# Patient Record
Sex: FEMALE | Race: WHITE | Employment: OTHER | URBAN - METROPOLITAN AREA
[De-identification: names, ages, dates, MRNs, and addresses within clinical notes are randomized per-mention and may not be internally consistent; named-entity substitution may affect disease eponyms.]

---

## 2023-03-07 ENCOUNTER — HOSPITAL ENCOUNTER (INPATIENT)
Age: 88
LOS: 6 days | Discharge: NURSING, REHAB OR LONG-TERM CARE | DRG: 513 | End: 2023-03-13
Attending: EMERGENCY MEDICINE | Admitting: INTERNAL MEDICINE
Payer: MEDICARE

## 2023-03-07 ENCOUNTER — APPOINTMENT (OUTPATIENT)
Dept: GENERAL RADIOLOGY | Age: 88
DRG: 513 | End: 2023-03-07
Payer: MEDICARE

## 2023-03-07 ENCOUNTER — APPOINTMENT (OUTPATIENT)
Dept: CT IMAGING | Age: 88
DRG: 513 | End: 2023-03-07
Payer: MEDICARE

## 2023-03-07 ENCOUNTER — ANESTHESIA EVENT (OUTPATIENT)
Dept: OPERATING ROOM | Age: 88
End: 2023-03-07
Payer: MEDICARE

## 2023-03-07 ENCOUNTER — ANESTHESIA (OUTPATIENT)
Dept: OPERATING ROOM | Age: 88
End: 2023-03-07
Payer: MEDICARE

## 2023-03-07 DIAGNOSIS — S42.292A OTHER CLOSED DISPLACED FRACTURE OF PROXIMAL END OF LEFT HUMERUS, INITIAL ENCOUNTER: Primary | ICD-10-CM

## 2023-03-07 DIAGNOSIS — S62.511D OPEN DISPLACED FRACTURE OF PROXIMAL PHALANX OF RIGHT THUMB WITH ROUTINE HEALING, SUBSEQUENT ENCOUNTER: ICD-10-CM

## 2023-03-07 DIAGNOSIS — S62.511B OPEN DISPLACED FRACTURE OF PROXIMAL PHALANX OF RIGHT THUMB, INITIAL ENCOUNTER: ICD-10-CM
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
## 2023-03-07 PROBLEM — S42.202A CLOSED FRACTURE OF LEFT PROXIMAL HUMERUS: Status: ACTIVE | Noted: 2023-03-07

## 2023-03-07 PROBLEM — S42.212A FX HUMERAL NECK, LEFT, CLOSED, INITIAL ENCOUNTER: Status: ACTIVE | Noted: 2023-03-07

## 2023-03-07 LAB
ABO/RH: NORMAL
ANION GAP SERPL CALCULATED.3IONS-SCNC: 11 MMOL/L (ref 3–16)
ANTIBODY SCREEN: NORMAL
BASOPHILS ABSOLUTE: 0 K/UL (ref 0–0.2)
BASOPHILS RELATIVE PERCENT: 0.4 %
BUN BLDV-MCNC: 31 MG/DL (ref 7–20)
CALCIUM SERPL-MCNC: 9.9 MG/DL (ref 8.3–10.6)
CHLORIDE BLD-SCNC: 93 MMOL/L (ref 99–110)
CO2: 26 MMOL/L (ref 21–32)
CREAT SERPL-MCNC: 0.7 MG/DL (ref 0.6–1.2)
EKG ATRIAL RATE: 68 BPM
EKG DIAGNOSIS: NORMAL
EKG P AXIS: 56 DEGREES
EKG P-R INTERVAL: 236 MS
EKG Q-T INTERVAL: 424 MS
EKG QRS DURATION: 72 MS
EKG QTC CALCULATION (BAZETT): 450 MS
EKG R AXIS: -5 DEGREES
EKG T AXIS: 35 DEGREES
EKG VENTRICULAR RATE: 68 BPM
EOSINOPHILS ABSOLUTE: 0.1 K/UL (ref 0–0.6)
EOSINOPHILS RELATIVE PERCENT: 1.7 %
GFR SERPL CREATININE-BSD FRML MDRD: >60 ML/MIN/{1.73_M2}
GLUCOSE BLD-MCNC: 126 MG/DL (ref 70–99)
HCT VFR BLD CALC: 41.9 % (ref 36–48)
HEMOGLOBIN: 14 G/DL (ref 12–16)
INR BLD: 0.97 (ref 0.87–1.14)
LYMPHOCYTES ABSOLUTE: 0.9 K/UL (ref 1–5.1)
LYMPHOCYTES RELATIVE PERCENT: 12 %
MCH RBC QN AUTO: 33.1 PG (ref 26–34)
MCHC RBC AUTO-ENTMCNC: 33.5 G/DL (ref 31–36)
MCV RBC AUTO: 98.9 FL (ref 80–100)
MONOCYTES ABSOLUTE: 0.8 K/UL (ref 0–1.3)
MONOCYTES RELATIVE PERCENT: 10.3 %
NEUTROPHILS ABSOLUTE: 5.9 K/UL (ref 1.7–7.7)
NEUTROPHILS RELATIVE PERCENT: 75.6 %
PDW BLD-RTO: 13.1 % (ref 12.4–15.4)
PLATELET # BLD: 203 K/UL (ref 135–450)
PMV BLD AUTO: 9.2 FL (ref 5–10.5)
POTASSIUM REFLEX MAGNESIUM: 4.4 MMOL/L (ref 3.5–5.1)
PROTHROMBIN TIME: 12.8 SEC (ref 11.7–14.5)
RBC # BLD: 4.24 M/UL (ref 4–5.2)
SODIUM BLD-SCNC: 130 MMOL/L (ref 136–145)
WBC # BLD: 7.8 K/UL (ref 4–11)

## 2023-03-07 PROCEDURE — 3700000000 HC ANESTHESIA ATTENDED CARE: Performed by: ORTHOPAEDIC SURGERY

## 2023-03-07 PROCEDURE — 73030 X-RAY EXAM OF SHOULDER: CPT

## 2023-03-07 PROCEDURE — 2500000003 HC RX 250 WO HCPCS: Performed by: EMERGENCY MEDICINE

## 2023-03-07 PROCEDURE — 80048 BASIC METABOLIC PNL TOTAL CA: CPT

## 2023-03-07 PROCEDURE — 70450 CT HEAD/BRAIN W/O DYE: CPT

## 2023-03-07 PROCEDURE — 73140 X-RAY EXAM OF FINGER(S): CPT

## 2023-03-07 PROCEDURE — 93005 ELECTROCARDIOGRAM TRACING: CPT | Performed by: EMERGENCY MEDICINE

## 2023-03-07 PROCEDURE — 6360000002 HC RX W HCPCS

## 2023-03-07 PROCEDURE — 85025 COMPLETE CBC W/AUTO DIFF WBC: CPT

## 2023-03-07 PROCEDURE — 3600000014 HC SURGERY LEVEL 4 ADDTL 15MIN: Performed by: ORTHOPAEDIC SURGERY

## 2023-03-07 PROCEDURE — 2709999900 HC NON-CHARGEABLE SUPPLY: Performed by: ORTHOPAEDIC SURGERY

## 2023-03-07 PROCEDURE — 2580000003 HC RX 258: Performed by: ORTHOPAEDIC SURGERY

## 2023-03-07 PROCEDURE — 26746 TREAT FINGER FRACTURE EACH: CPT | Performed by: ORTHOPAEDIC SURGERY

## 2023-03-07 PROCEDURE — 86850 RBC ANTIBODY SCREEN: CPT

## 2023-03-07 PROCEDURE — 6360000002 HC RX W HCPCS: Performed by: ORTHOPAEDIC SURGERY

## 2023-03-07 PROCEDURE — 2580000003 HC RX 258

## 2023-03-07 PROCEDURE — 85610 PROTHROMBIN TIME: CPT

## 2023-03-07 PROCEDURE — 70486 CT MAXILLOFACIAL W/O DYE: CPT

## 2023-03-07 PROCEDURE — 7100000000 HC PACU RECOVERY - FIRST 15 MIN: Performed by: ORTHOPAEDIC SURGERY

## 2023-03-07 PROCEDURE — 2580000003 HC RX 258: Performed by: INTERNAL MEDICINE

## 2023-03-07 PROCEDURE — 6370000000 HC RX 637 (ALT 250 FOR IP): Performed by: EMERGENCY MEDICINE

## 2023-03-07 PROCEDURE — 2500000003 HC RX 250 WO HCPCS: Performed by: ORTHOPAEDIC SURGERY

## 2023-03-07 PROCEDURE — 6370000000 HC RX 637 (ALT 250 FOR IP): Performed by: ORTHOPAEDIC SURGERY

## 2023-03-07 PROCEDURE — 11012 DEB SKIN BONE AT FX SITE: CPT | Performed by: ORTHOPAEDIC SURGERY

## 2023-03-07 PROCEDURE — 7100000001 HC PACU RECOVERY - ADDTL 15 MIN: Performed by: ORTHOPAEDIC SURGERY

## 2023-03-07 PROCEDURE — 0RQW0ZZ REPAIR RIGHT FINGER PHALANGEAL JOINT, OPEN APPROACH: ICD-10-PCS | Performed by: ORTHOPAEDIC SURGERY

## 2023-03-07 PROCEDURE — 86900 BLOOD TYPING SEROLOGIC ABO: CPT

## 2023-03-07 PROCEDURE — 3600000004 HC SURGERY LEVEL 4 BASE: Performed by: ORTHOPAEDIC SURGERY

## 2023-03-07 PROCEDURE — 1200000000 HC SEMI PRIVATE

## 2023-03-07 PROCEDURE — 99285 EMERGENCY DEPT VISIT HI MDM: CPT

## 2023-03-07 PROCEDURE — 0RSW0ZZ REPOSITION RIGHT FINGER PHALANGEAL JOINT, OPEN APPROACH: ICD-10-PCS | Performed by: ORTHOPAEDIC SURGERY

## 2023-03-07 PROCEDURE — 99222 1ST HOSP IP/OBS MODERATE 55: CPT | Performed by: ORTHOPAEDIC SURGERY

## 2023-03-07 PROCEDURE — 6360000002 HC RX W HCPCS: Performed by: FAMILY MEDICINE

## 2023-03-07 PROCEDURE — 86901 BLOOD TYPING SEROLOGIC RH(D): CPT

## 2023-03-07 PROCEDURE — 3700000001 HC ADD 15 MINUTES (ANESTHESIA): Performed by: ORTHOPAEDIC SURGERY

## 2023-03-07 PROCEDURE — 71045 X-RAY EXAM CHEST 1 VIEW: CPT

## 2023-03-07 RX ORDER — BUDESONIDE 0.5 MG/2ML
0.5 INHALANT ORAL
Status: DISCONTINUED | OUTPATIENT
Start: 2023-03-07 | End: 2023-03-13 | Stop reason: HOSPADM

## 2023-03-07 RX ORDER — SODIUM CHLORIDE 0.9 % (FLUSH) 0.9 %
5-40 SYRINGE (ML) INJECTION PRN
Status: DISCONTINUED | OUTPATIENT
Start: 2023-03-07 | End: 2023-03-13 | Stop reason: HOSPADM

## 2023-03-07 RX ORDER — MORPHINE SULFATE 2 MG/ML
2 INJECTION, SOLUTION INTRAMUSCULAR; INTRAVENOUS
Status: DISPENSED | OUTPATIENT
Start: 2023-03-07 | End: 2023-03-09

## 2023-03-07 RX ORDER — ACETAMINOPHEN 650 MG/1
650 SUPPOSITORY RECTAL EVERY 6 HOURS PRN
Status: DISCONTINUED | OUTPATIENT
Start: 2023-03-07 | End: 2023-03-13 | Stop reason: HOSPADM

## 2023-03-07 RX ORDER — BUPIVACAINE HYDROCHLORIDE 5 MG/ML
INJECTION, SOLUTION EPIDURAL; INTRACAUDAL PRN
Status: DISCONTINUED | OUTPATIENT
Start: 2023-03-07 | End: 2023-03-07 | Stop reason: HOSPADM

## 2023-03-07 RX ORDER — OXYCODONE HYDROCHLORIDE 5 MG/1
5 TABLET ORAL ONCE
Status: COMPLETED | OUTPATIENT
Start: 2023-03-07 | End: 2023-03-07

## 2023-03-07 RX ORDER — VALSARTAN 320 MG/1
1 TABLET ORAL DAILY
COMMUNITY
Start: 2023-02-15

## 2023-03-07 RX ORDER — NIFEDIPINE 60 MG/1
1 TABLET, FILM COATED, EXTENDED RELEASE ORAL 2 TIMES DAILY
COMMUNITY
Start: 2023-01-19

## 2023-03-07 RX ORDER — HYDROCODONE BITARTRATE AND ACETAMINOPHEN 5; 325 MG/1; MG/1
1 TABLET ORAL EVERY 4 HOURS PRN
Status: DISCONTINUED | OUTPATIENT
Start: 2023-03-07 | End: 2023-03-13 | Stop reason: ALTCHOICE

## 2023-03-07 RX ORDER — MORPHINE SULFATE 2 MG/ML
2 INJECTION, SOLUTION INTRAMUSCULAR; INTRAVENOUS
Status: DISCONTINUED | OUTPATIENT
Start: 2023-03-07 | End: 2023-03-07

## 2023-03-07 RX ORDER — CLONIDINE HYDROCHLORIDE 0.1 MG/1
1 TABLET ORAL DAILY PRN
COMMUNITY
Start: 2020-10-13

## 2023-03-07 RX ORDER — POTASSIUM CHLORIDE 20 MEQ/1
40 TABLET, EXTENDED RELEASE ORAL PRN
Status: DISCONTINUED | OUTPATIENT
Start: 2023-03-07 | End: 2023-03-13 | Stop reason: HOSPADM

## 2023-03-07 RX ORDER — LABETALOL HYDROCHLORIDE 5 MG/ML
10 INJECTION, SOLUTION INTRAVENOUS
Status: DISCONTINUED | OUTPATIENT
Start: 2023-03-07 | End: 2023-03-07 | Stop reason: HOSPADM

## 2023-03-07 RX ORDER — LORAZEPAM 2 MG/ML
0.5 INJECTION INTRAMUSCULAR
Status: DISCONTINUED | OUTPATIENT
Start: 2023-03-07 | End: 2023-03-07 | Stop reason: HOSPADM

## 2023-03-07 RX ORDER — ACETAMINOPHEN 325 MG/1
650 TABLET ORAL EVERY 6 HOURS PRN
Status: DISCONTINUED | OUTPATIENT
Start: 2023-03-07 | End: 2023-03-13 | Stop reason: HOSPADM

## 2023-03-07 RX ORDER — IPRATROPIUM BROMIDE AND ALBUTEROL SULFATE 2.5; .5 MG/3ML; MG/3ML
1 SOLUTION RESPIRATORY (INHALATION)
Status: DISCONTINUED | OUTPATIENT
Start: 2023-03-07 | End: 2023-03-07 | Stop reason: HOSPADM

## 2023-03-07 RX ORDER — ATORVASTATIN CALCIUM 40 MG/1
40 TABLET, FILM COATED ORAL NIGHTLY
Status: DISCONTINUED | OUTPATIENT
Start: 2023-03-07 | End: 2023-03-13 | Stop reason: HOSPADM

## 2023-03-07 RX ORDER — MAGNESIUM SULFATE IN WATER 40 MG/ML
2000 INJECTION, SOLUTION INTRAVENOUS PRN
Status: DISCONTINUED | OUTPATIENT
Start: 2023-03-07 | End: 2023-03-13 | Stop reason: HOSPADM

## 2023-03-07 RX ORDER — SODIUM CHLORIDE 450 MG/100ML
INJECTION, SOLUTION INTRAVENOUS CONTINUOUS
Status: DISCONTINUED | OUTPATIENT
Start: 2023-03-07 | End: 2023-03-08

## 2023-03-07 RX ORDER — POTASSIUM CHLORIDE 7.45 MG/ML
10 INJECTION INTRAVENOUS PRN
Status: DISCONTINUED | OUTPATIENT
Start: 2023-03-07 | End: 2023-03-13 | Stop reason: HOSPADM

## 2023-03-07 RX ORDER — PROCHLORPERAZINE EDISYLATE 5 MG/ML
5 INJECTION INTRAMUSCULAR; INTRAVENOUS
Status: DISCONTINUED | OUTPATIENT
Start: 2023-03-07 | End: 2023-03-07 | Stop reason: HOSPADM

## 2023-03-07 RX ORDER — CARVEDILOL 6.25 MG/1
3.12 TABLET ORAL 2 TIMES DAILY WITH MEALS
Status: DISCONTINUED | OUTPATIENT
Start: 2023-03-07 | End: 2023-03-13 | Stop reason: HOSPADM

## 2023-03-07 RX ORDER — CLONIDINE HYDROCHLORIDE 0.1 MG/1
0.1 TABLET ORAL 2 TIMES DAILY
Status: DISCONTINUED | OUTPATIENT
Start: 2023-03-07 | End: 2023-03-07

## 2023-03-07 RX ORDER — ARFORMOTEROL TARTRATE 15 UG/2ML
15 SOLUTION RESPIRATORY (INHALATION)
Status: DISCONTINUED | OUTPATIENT
Start: 2023-03-07 | End: 2023-03-13 | Stop reason: HOSPADM

## 2023-03-07 RX ORDER — ONDANSETRON 2 MG/ML
4 INJECTION INTRAMUSCULAR; INTRAVENOUS
Status: DISCONTINUED | OUTPATIENT
Start: 2023-03-07 | End: 2023-03-07 | Stop reason: HOSPADM

## 2023-03-07 RX ORDER — SPIRONOLACTONE 25 MG/1
25 TABLET ORAL DAILY
Status: DISCONTINUED | OUTPATIENT
Start: 2023-03-07 | End: 2023-03-13 | Stop reason: HOSPADM

## 2023-03-07 RX ORDER — SODIUM CHLORIDE 9 MG/ML
25 INJECTION, SOLUTION INTRAVENOUS PRN
Status: DISCONTINUED | OUTPATIENT
Start: 2023-03-07 | End: 2023-03-07 | Stop reason: HOSPADM

## 2023-03-07 RX ORDER — ACETAMINOPHEN 650 MG/1
650 SUPPOSITORY RECTAL
Status: DISCONTINUED | OUTPATIENT
Start: 2023-03-07 | End: 2023-03-07 | Stop reason: HOSPADM

## 2023-03-07 RX ORDER — CLINDAMYCIN HYDROCHLORIDE 300 MG/1
300 CAPSULE ORAL ONCE
Status: COMPLETED | OUTPATIENT
Start: 2023-03-07 | End: 2023-03-07

## 2023-03-07 RX ORDER — MORPHINE SULFATE 2 MG/ML
2 INJECTION, SOLUTION INTRAMUSCULAR; INTRAVENOUS EVERY 4 HOURS PRN
Status: DISCONTINUED | OUTPATIENT
Start: 2023-03-07 | End: 2023-03-07

## 2023-03-07 RX ORDER — FAMOTIDINE 20 MG/1
1 TABLET, FILM COATED ORAL NIGHTLY PRN
COMMUNITY

## 2023-03-07 RX ORDER — SODIUM CHLORIDE 0.9 % (FLUSH) 0.9 %
5-40 SYRINGE (ML) INJECTION EVERY 12 HOURS SCHEDULED
Status: DISCONTINUED | OUTPATIENT
Start: 2023-03-07 | End: 2023-03-13 | Stop reason: HOSPADM

## 2023-03-07 RX ORDER — SENNOSIDES 8.8 MG/5ML
5 LIQUID ORAL 2 TIMES DAILY PRN
Status: DISCONTINUED | OUTPATIENT
Start: 2023-03-07 | End: 2023-03-13 | Stop reason: HOSPADM

## 2023-03-07 RX ORDER — ONDANSETRON 2 MG/ML
4 INJECTION INTRAMUSCULAR; INTRAVENOUS EVERY 6 HOURS PRN
Status: DISCONTINUED | OUTPATIENT
Start: 2023-03-07 | End: 2023-03-13 | Stop reason: HOSPADM

## 2023-03-07 RX ORDER — SPIRONOLACTONE 25 MG/1
1 TABLET ORAL DAILY
COMMUNITY
Start: 2023-02-17

## 2023-03-07 RX ORDER — VALSARTAN 320 MG/1
320 TABLET ORAL DAILY
Status: DISCONTINUED | OUTPATIENT
Start: 2023-03-07 | End: 2023-03-13 | Stop reason: HOSPADM

## 2023-03-07 RX ORDER — CARVEDILOL 3.12 MG/1
1 TABLET ORAL 2 TIMES DAILY WITH MEALS
COMMUNITY
Start: 2022-04-21

## 2023-03-07 RX ORDER — SODIUM CHLORIDE, SODIUM LACTATE, POTASSIUM CHLORIDE, CALCIUM CHLORIDE 600; 310; 30; 20 MG/100ML; MG/100ML; MG/100ML; MG/100ML
INJECTION, SOLUTION INTRAVENOUS CONTINUOUS PRN
Status: DISCONTINUED | OUTPATIENT
Start: 2023-03-07 | End: 2023-03-07 | Stop reason: SDUPTHER

## 2023-03-07 RX ORDER — PAROXETINE HYDROCHLORIDE 20 MG/1
20 TABLET, FILM COATED ORAL DAILY
Status: DISCONTINUED | OUTPATIENT
Start: 2023-03-08 | End: 2023-03-13 | Stop reason: HOSPADM

## 2023-03-07 RX ORDER — ENOXAPARIN SODIUM 100 MG/ML
40 INJECTION SUBCUTANEOUS DAILY
Status: CANCELLED | OUTPATIENT
Start: 2023-03-07

## 2023-03-07 RX ORDER — SODIUM CHLORIDE 9 MG/ML
INJECTION, SOLUTION INTRAVENOUS PRN
Status: DISCONTINUED | OUTPATIENT
Start: 2023-03-07 | End: 2023-03-13 | Stop reason: HOSPADM

## 2023-03-07 RX ORDER — ACETAMINOPHEN 500 MG
1000 TABLET ORAL
Status: COMPLETED | OUTPATIENT
Start: 2023-03-07 | End: 2023-03-07

## 2023-03-07 RX ORDER — SODIUM CHLORIDE 0.9 % (FLUSH) 0.9 %
5-40 SYRINGE (ML) INJECTION PRN
Status: DISCONTINUED | OUTPATIENT
Start: 2023-03-07 | End: 2023-03-07 | Stop reason: HOSPADM

## 2023-03-07 RX ORDER — NIFEDIPINE 60 MG/1
60 TABLET, EXTENDED RELEASE ORAL 2 TIMES DAILY
Status: DISCONTINUED | OUTPATIENT
Start: 2023-03-07 | End: 2023-03-13 | Stop reason: HOSPADM

## 2023-03-07 RX ORDER — SODIUM CHLORIDE 9 MG/ML
INJECTION, SOLUTION INTRAVENOUS CONTINUOUS
Status: DISCONTINUED | OUTPATIENT
Start: 2023-03-07 | End: 2023-03-08

## 2023-03-07 RX ORDER — FENTANYL CITRATE 50 UG/ML
25 INJECTION, SOLUTION INTRAMUSCULAR; INTRAVENOUS EVERY 5 MIN PRN
Status: DISCONTINUED | OUTPATIENT
Start: 2023-03-07 | End: 2023-03-07 | Stop reason: HOSPADM

## 2023-03-07 RX ORDER — ONDANSETRON 4 MG/1
4 TABLET, ORALLY DISINTEGRATING ORAL EVERY 8 HOURS PRN
Status: DISCONTINUED | OUTPATIENT
Start: 2023-03-07 | End: 2023-03-13 | Stop reason: HOSPADM

## 2023-03-07 RX ORDER — SODIUM CHLORIDE 0.9 % (FLUSH) 0.9 %
5-40 SYRINGE (ML) INJECTION EVERY 12 HOURS SCHEDULED
Status: DISCONTINUED | OUTPATIENT
Start: 2023-03-07 | End: 2023-03-07 | Stop reason: HOSPADM

## 2023-03-07 RX ORDER — FAMOTIDINE 20 MG/1
20 TABLET, FILM COATED ORAL NIGHTLY PRN
Status: DISCONTINUED | OUTPATIENT
Start: 2023-03-07 | End: 2023-03-13 | Stop reason: HOSPADM

## 2023-03-07 RX ORDER — POLYETHYLENE GLYCOL 3350 17 G/17G
17 POWDER, FOR SOLUTION ORAL DAILY PRN
Status: DISCONTINUED | OUTPATIENT
Start: 2023-03-07 | End: 2023-03-13 | Stop reason: HOSPADM

## 2023-03-07 RX ORDER — PROPOFOL 10 MG/ML
INJECTION, EMULSION INTRAVENOUS PRN
Status: DISCONTINUED | OUTPATIENT
Start: 2023-03-07 | End: 2023-03-07 | Stop reason: SDUPTHER

## 2023-03-07 RX ORDER — ATORVASTATIN CALCIUM 40 MG/1
1 TABLET, FILM COATED ORAL NIGHTLY
COMMUNITY
Start: 2023-02-14

## 2023-03-07 RX ORDER — FLUTICASONE FUROATE, UMECLIDINIUM BROMIDE AND VILANTEROL TRIFENATATE 200; 62.5; 25 UG/1; UG/1; UG/1
1 POWDER RESPIRATORY (INHALATION) 2 TIMES DAILY
COMMUNITY
Start: 2023-03-04

## 2023-03-07 RX ORDER — LIDOCAINE HYDROCHLORIDE 20 MG/ML
INJECTION, SOLUTION INTRAVENOUS PRN
Status: DISCONTINUED | OUTPATIENT
Start: 2023-03-07 | End: 2023-03-07 | Stop reason: SDUPTHER

## 2023-03-07 RX ORDER — ASPIRIN 81 MG/1
1 TABLET ORAL
Status: ON HOLD | COMMUNITY
End: 2023-03-10 | Stop reason: SDUPTHER

## 2023-03-07 RX ADMIN — HYDROMORPHONE HYDROCHLORIDE 0.5 MG: 1 INJECTION, SOLUTION INTRAMUSCULAR; INTRAVENOUS; SUBCUTANEOUS at 19:36

## 2023-03-07 RX ADMIN — LIDOCAINE HYDROCHLORIDE 5 ML: 10 INJECTION, SOLUTION EPIDURAL; INFILTRATION; INTRACAUDAL; PERINEURAL at 14:48

## 2023-03-07 RX ADMIN — ACETAMINOPHEN 1000 MG: 500 TABLET ORAL at 14:43

## 2023-03-07 RX ADMIN — SODIUM CHLORIDE: 9 INJECTION, SOLUTION INTRAVENOUS at 21:51

## 2023-03-07 RX ADMIN — HYDROMORPHONE HYDROCHLORIDE 0.5 MG: 1 INJECTION, SOLUTION INTRAMUSCULAR; INTRAVENOUS; SUBCUTANEOUS at 19:40

## 2023-03-07 RX ADMIN — HYDROCODONE BITARTRATE AND ACETAMINOPHEN 1 TABLET: 5; 325 TABLET ORAL at 22:16

## 2023-03-07 RX ADMIN — NIFEDIPINE 60 MG: 60 TABLET, FILM COATED, EXTENDED RELEASE ORAL at 22:08

## 2023-03-07 RX ADMIN — ATORVASTATIN CALCIUM 40 MG: 40 TABLET, FILM COATED ORAL at 21:55

## 2023-03-07 RX ADMIN — SODIUM CHLORIDE: 9 INJECTION, SOLUTION INTRAVENOUS at 16:49

## 2023-03-07 RX ADMIN — OXYCODONE 5 MG: 5 TABLET ORAL at 14:44

## 2023-03-07 RX ADMIN — CLINDAMYCIN HYDROCHLORIDE 300 MG: 300 CAPSULE ORAL at 14:42

## 2023-03-07 RX ADMIN — VALSARTAN 320 MG: 320 TABLET, FILM COATED ORAL at 22:04

## 2023-03-07 RX ADMIN — CARVEDILOL 3.12 MG: 6.25 TABLET, FILM COATED ORAL at 21:55

## 2023-03-07 RX ADMIN — LIDOCAINE HYDROCHLORIDE 50 MG: 20 INJECTION, SOLUTION INTRAVENOUS at 18:28

## 2023-03-07 RX ADMIN — SODIUM CHLORIDE, PRESERVATIVE FREE 10 ML: 5 INJECTION INTRAVENOUS at 22:06

## 2023-03-07 RX ADMIN — CEFAZOLIN 2000 MG: 2 INJECTION, POWDER, FOR SOLUTION INTRAMUSCULAR; INTRAVENOUS at 21:55

## 2023-03-07 RX ADMIN — SODIUM CHLORIDE, SODIUM LACTATE, POTASSIUM CHLORIDE, AND CALCIUM CHLORIDE: .6; .31; .03; .02 INJECTION, SOLUTION INTRAVENOUS at 18:20

## 2023-03-07 RX ADMIN — PROPOFOL 20 MG: 10 INJECTION, EMULSION INTRAVENOUS at 18:28

## 2023-03-07 ASSESSMENT — PAIN DESCRIPTION - LOCATION
LOCATION: SHOULDER

## 2023-03-07 ASSESSMENT — PAIN DESCRIPTION - DESCRIPTORS
DESCRIPTORS: DISCOMFORT
DESCRIPTORS: TENDER;TEARING
DESCRIPTORS: DISCOMFORT
DESCRIPTORS: ACHING
DESCRIPTORS: ACHING
DESCRIPTORS: SHARP
DESCRIPTORS: ACHING

## 2023-03-07 ASSESSMENT — PAIN SCALES - GENERAL
PAINLEVEL_OUTOF10: 9
PAINLEVEL_OUTOF10: 0
PAINLEVEL_OUTOF10: 4
PAINLEVEL_OUTOF10: 8
PAINLEVEL_OUTOF10: 4
PAINLEVEL_OUTOF10: 2
PAINLEVEL_OUTOF10: 6
PAINLEVEL_OUTOF10: 5
PAINLEVEL_OUTOF10: 7
PAINLEVEL_OUTOF10: 6

## 2023-03-07 ASSESSMENT — PAIN DESCRIPTION - ORIENTATION
ORIENTATION: LEFT

## 2023-03-07 ASSESSMENT — PAIN - FUNCTIONAL ASSESSMENT
PAIN_FUNCTIONAL_ASSESSMENT: 0-10
PAIN_FUNCTIONAL_ASSESSMENT: 0-10

## 2023-03-07 NOTE — H&P
Hospital Medicine History & Physical      PCP: Alyse Mcmullen, APRN - CNP    Date of Admission: 3/7/2023    Date of Service: Pt seen/examined on 3/7/23 and Admitted to Inpatient with expected LOS greater than two midnights due to medical therapy. Chief Complaint:    Fall (Pt tripped and landed on her left shoulder. Laceration on right thumb and upper lip.)         History Of Present Illness:      80 y.o. female Hx CHF,HTN, Anxiety,Emphysema who presented with after fall. She slipped on her way to [employer]. She was walking on ramp with construction. She landed on left side. Pt had pain after fall. She had no LOC. She sustained head trauma to left side of face. Pt is otherwise coherent. In ER,vitals unremarkable. Xray right finger showed acute fracture of the base of the first digit distal phalanx. Xray left shoulder showed acute minimally displaced fracture of the left humeral neck. CXR showed Small bilateral pleural effusions and acute left humeral neck fracture. CT head/maxillofacial showed Small midline/left anterior frontal scalp hematoma,Mild-to-moderate left infraorbital/upper cheek swelling. ER provider contacted Orthopedic Surgery. Plan for OR today. Past Medical History:          Diagnosis Date    Arthritis     Fractures     Osteoarthritis     Osteoporosis        Past Surgical History:          Procedure Laterality Date    HIP SURGERY         Medications Prior to Admission:      Prior to Admission medications    Medication Sig Start Date End Date Taking?  Authorizing Provider   atorvastatin (LIPITOR) 40 MG tablet Take 1 tablet by mouth at bedtime 2/14/23  Yes Historical Provider, MD   carvedilol (COREG) 3.125 MG tablet Take 1 tablet by mouth 2 times daily (with meals) 4/21/22  Yes Historical Provider, MD   cloNIDine (CATAPRES) 0.1 MG tablet Take 1 tablet by mouth 2 times daily 10/13/20  Yes Historical Provider, MD   aspirin 81 MG EC tablet Take 1 tablet by mouth Twice a Week    Historical Provider, MD famotidine (PEPCID) 20 MG tablet Take 1 tablet by mouth nightly as needed    Historical Provider, MD Cesario Habermann 200-62.5-25 MCG/ACT AEPB inhaler Inhale 1 spray into the lungs 2 times daily 3/4/23   Historical Provider, MD   NIFEdipine (ADALAT CC) 60 MG extended release tablet Take 1 tablet by mouth daily 1/19/23   Historical Provider, MD   spironolactone (ALDACTONE) 25 MG tablet Take 1 tablet by mouth daily 2/17/23   Historical Provider, MD   valsartan (DIOVAN) 320 MG tablet Take 1 tablet by mouth daily 2/15/23   Historical Provider, MD   PARoxetine (PAXIL) 20 MG tablet  3/25/16   Historical Provider, MD   hydrochlorothiazide (HYDRODIURIL) 25 MG tablet  2/6/16   Historical Provider, MD   fluorometholone (FML) 0.1 % ophthalmic suspension  3/31/16   Historical Provider, MD   acetaminophen-codeine (TYLENOL/CODEINE #3) 300-30 MG per tablet Take 1 tablet by mouth every 4 hours as needed for Pain 4/20/16   Dilcia Castaneda MD       Allergies:  Penicillins and Sulfa antibiotics    Social History:      The patient currently lives at home    TOBACCO:   reports that she has quit smoking. She has never used smokeless tobacco.  ETOH:   reports current alcohol use of about 1.0 standard drink per week. E-cigarette/Vaping       Questions Responses    E-cigarette/Vaping Use     Start Date     Passive Exposure     Quit Date     Counseling Given     Comments               Family History:       Reviewed in detail and negative for DM, CAD, Cancer, CVA. Positive as follows:        Problem Relation Age of Onset    Cancer Mother     Scoliosis Paternal Grandmother     Diabetes Paternal Grandfather        REVIEW OF SYSTEMS:   Pertinent positives as noted in the HPI. All other systems reviewed and negative.     PHYSICAL EXAM PERFORMED:    /66   Pulse 69   Resp 18   Ht 5' 5\" (1.651 m)   Wt 176 lb 1.6 oz (79.9 kg)   SpO2 98%   BMI 29.30 kg/m²     General appearance:  No apparent distress, appears stated age and cooperative. HEENT:  MMM,left orbit echymosis,left lip swelling  Neck: Supple, with full range of motion. No jugular venous distention. Trachea midline. Respiratory:  Normal respiratory effort. Clear to auscultation, bilaterally   Cardiovascular:  Regular rate and rhythm with normal S1/S2 without murmurs  Abdomen: Soft, non-tender, non-distended with normal bowel sounds. Musculoskeletal:  No edema bilaterally,left arm in sling, right thumb in dressing  Skin: Skin color, texture, turgor normal.  No rashes or lesions. Neurologic:  grossly non-focal.  Psychiatric:  Alert and oriented, thought content appropriate, normal insight      Labs:     No results for input(s): WBC, HGB, HCT, PLT in the last 72 hours. No results for input(s): NA, K, CL, CO2, BUN, CREATININE, CALCIUM, PHOS in the last 72 hours. Invalid input(s): MAGNES  No results for input(s): AST, ALT, BILIDIR, BILITOT, ALKPHOS in the last 72 hours. No results for input(s): INR in the last 72 hours. No results for input(s): Lattie Fanny in the last 72 hours. Urinalysis:    No results found for: Ayesha Beans, BACTERIA, RBCUA, BLOODU, Viann Richard, [address] [place] [id number]    Radiology:     EKG:  Pending    XR SHOULDER LEFT (MIN 2 VIEWS)   Final Result   1. Acute minimally displaced fracture of the left humeral neck. XR FINGER RIGHT (MIN 2 VIEWS)   Final Result   1. Acute fracture of the base of the first digit distal phalanx. XR CHEST PORTABLE   Final Result   1. Small bilateral pleural effusions. 2.  Acute fracture of the left humeral neck      CT MAXILLOFACIAL WO CONTRAST   Final Result   HEAD:   Small midline/left anterior frontal scalp hematoma with no underlying calvarial fracture, intrarenal hemorrhage, or mass effect. Mild atrophy and chronic small vessel ischemic change. FACE:   Mild-to-moderate left infraorbital/upper cheek swelling, with no underlying acute facial fracture.           CT Head W/O Contrast   Final Result   HEAD: Small midline/left anterior frontal scalp hematoma with no underlying calvarial fracture, intrarenal hemorrhage, or mass effect. Mild atrophy and chronic small vessel ischemic change. FACE:   Mild-to-moderate left infraorbital/upper cheek swelling, with no underlying acute facial fracture. ASSESSMENT:    Active Hospital Problems    Diagnosis Date Noted    Fx humeral neck, left, closed, initial encounter Ronal Doe 03/07/2023     Priority: Medium     Left Humeral neck fracture  -Orthopedic Surgery consulted. Plan for surgery today.  -Last stress test 5/2022 didn't show ischemia. Last Echo 1/2022 showed EF 55-60%. Pt is active and doesn't have CHF symptoms. EKG doesn't show ischemic changes. Pt has 3.9 % 30-day risk of death, MI, or cardiac arrest per cardiac risk index score. Ok to proceed with surgery.  -Pain control    Displaced first digit distal phalanx fracture. -Orthopedic Surgery consulted. Plan for surgery today.  -Pain control     Left infraorbital/upper cheek swelling  -Supportive care    Small midline/left anterior frontal scalp hematoma  -Supportive care. Avoiding heparin products    Mechanical fall  -PT/OT  -Fall precautions    Hypertension  -BP controlled    Chronic diastolic CHF  Hx Takotsubo cardimyopathy with LV thrombus in 2017  -Pt is compensated. Pt is on ARB and coreg.  -Telemetry. Hx PVCs  -Per outpatient Cardiology note, pt wore Holter and event recorder no Afib seen     Emphysema  -Pt is on treligy and follows with Pulmonology outpatient.       DVT Prophylaxis: lSCDs  Diet: Diet NPO  Code Status: No Order    Code Status: No Order    Surrogate decision maker confirmed with patient:   Extended Emergency Contact Information  Primary Emergency Contact: Samuel Pan  Address: [address] [facility], [address] [place] [address] Phone: [phone]  Mobile Phone: [phone]  Relation: Spouse    PT/OT Eval Status: ordered    [address] [place] [address] in 1-2days Martha Zabala MD    Thank you ALESSANDRA Linda CNP for the opportunity to be involved in this patient's care. If you have any questions or concerns please feel free to contact me at [phone].

## 2023-03-07 NOTE — CONSULTS
[facility] Orthopedic Surgery  Consult Note         This patient is seen in consultation at the request of Dr Christopher Prasad MD    Reason for Consult:    1- Right hand pain/ thumb distal phalanx base open fracture. 2- Left shoulder pain/ moderately displaced proximal humerus fracture. CHIEF COMPLAINT:    1- Right hand pain/ thumb distal phalanx base open fracture. 2- Left shoulder pain/ moderately displaced proximal humerus fracture. History Obtained From:  patient, electronic medical record    HISTORY OF PRESENT ILLNESS:    Ms. Romie Cabrera 80 y.o.  female right handed seen today at [facility] for consultation and evaluation of a right hand thumb and left shoulder injury which occurred when she tripped and fell. She is complaining of right thumb and left shoulder pain and swelling with open wound right thumb. This is better with elevation and worse with ROM. The pain is sharp and not radiating. No other complaint. She was seen at [facility], was evaluated and I was consulted. Past Medical History:        Diagnosis Date    Arthritis     Fractures     Osteoarthritis     Osteoporosis        Past Surgical History:        Procedure Laterality Date    HIP SURGERY         Medications prior to admission:   Prior to Admission medications    Medication Sig Start Date End Date Taking?  Authorizing Provider   atorvastatin (LIPITOR) 40 MG tablet Take 1 tablet by mouth at bedtime 2/14/23  Yes Historical Provider, MD   carvedilol (COREG) 3.125 MG tablet Take 1 tablet by mouth 2 times daily (with meals) 4/21/22  Yes Historical Provider, MD   cloNIDine (CATAPRES) 0.1 MG tablet Take 1 tablet by mouth 2 times daily 10/13/20  Yes Historical Provider, MD   aspirin 81 MG EC tablet Take 1 tablet by mouth Twice a Week    Historical Provider, MD   famotidine (PEPCID) 20 MG tablet Take 1 tablet by mouth nightly as needed    Historical Provider, MD Aleida Godinez ELLIPTA 200-62.5-25 MCG/ACT AEPB inhaler Inhale 1 spray into the lungs 2 times daily 3/4/23   Historical Provider, MD   NIFEdipine (ADALAT CC) 60 MG extended release tablet Take 1 tablet by mouth daily 1/19/23   Historical Provider, MD   spironolactone (ALDACTONE) 25 MG tablet Take 1 tablet by mouth daily 2/17/23   Historical Provider, MD   valsartan (DIOVAN) 320 MG tablet Take 1 tablet by mouth daily 2/15/23   Historical Provider, MD   PARoxetine (PAXIL) 20 MG tablet  3/25/16   Historical Provider, MD   hydrochlorothiazide (HYDRODIURIL) 25 MG tablet  2/6/16   Historical Provider, MD   fluorometholone (FML) 0.1 % ophthalmic suspension  3/31/16   Historical Provider, MD   acetaminophen-codeine (TYLENOL/CODEINE #3) 300-30 MG per tablet Take 1 tablet by mouth every 4 hours as needed for Pain 4/20/16   Kami Powers MD       Current Medications:   Current Facility-Administered Medications: atorvastatin (LIPITOR) tablet 40 mg, 40 mg, Oral, Nightly  carvedilol (COREG) tablet 3.125 mg, 3.125 mg, Oral, BID WC  cloNIDine (CATAPRES) tablet 0.1 mg, 0.1 mg, Oral, BID  famotidine (PEPCID) tablet 20 mg, 20 mg, Oral, Nightly PRN  NIFEdipine (ADALAT CC) extended release tablet 60 mg, 60 mg, Oral, Daily  spironolactone (ALDACTONE) tablet 25 mg, 25 mg, Oral, Daily  fluticasone-umeclidin-vilant (TRELEGY ELLIPTA) 200-62.5-25 MCG/ACT inhaler 1 puff, 1 puff, Inhalation, BID  valsartan (DIOVAN) tablet 320 mg, 1 tablet, Oral, Daily  sodium chloride flush 0.9 % injection 5-40 mL, 5-40 mL, IntraVENous, 2 times per day  sodium chloride flush 0.9 % injection 5-40 mL, 5-40 mL, IntraVENous, PRN  0.9 % sodium chloride infusion, , IntraVENous, PRN  ondansetron (ZOFRAN-ODT) disintegrating tablet 4 mg, 4 mg, Oral, Q8H PRN **OR** ondansetron (ZOFRAN) injection 4 mg, 4 mg, IntraVENous, Q6H PRN  polyethylene glycol (GLYCOLAX) packet 17 g, 17 g, Oral, Daily PRN  acetaminophen (TYLENOL) tablet 650 mg, 650 mg, Oral, Q6H PRN **OR** acetaminophen (TYLENOL) suppository 650 mg, 650 mg, Rectal, Q6H PRN  0.9 % sodium chloride infusion, , IntraVENous, Continuous  potassium chloride (KLOR-CON M) extended release tablet 40 mEq, 40 mEq, Oral, PRN **OR** potassium bicarb-citric acid (EFFER-K) effervescent tablet 40 mEq, 40 mEq, Oral, PRN **OR** potassium chloride 10 mEq/100 mL IVPB (Peripheral Line), 10 mEq, IntraVENous, PRN  magnesium sulfate 2000 mg in 50 mL IVPB premix, 2,000 mg, IntraVENous, PRN  senna (SENOKOT) 8.8 MG/5ML syrup 8.8 mg, 5 mL, Oral, BID PRN  morphine (PF) injection 2 mg, 2 mg, IntraVENous, Q4H PRN  HYDROcodone-acetaminophen (NORCO) 5-325 MG per tablet 1 tablet, 1 tablet, Oral, Q4H PRN  [START ON 3/8/2023] PARoxetine (PAXIL) tablet 20 mg, 20 mg, Oral, Daily    Allergies:  Penicillins and Sulfa antibiotics    Social History     Socioeconomic History    Marital status:      Spouse name: Not on file    Number of children: Not on file    Years of education: Not on file    Highest education level: Not on file   Occupational History    Not on file   Tobacco Use    Smoking status: Former    Smokeless tobacco: Never   Substance and Sexual Activity    Alcohol use:  Yes     Alcohol/week: 1.0 standard drink     Types: 1 Glasses of wine per week    Drug use: No    Sexual activity: Never   Other Topics Concern    Not on file   Social History Narrative    Not on file     Social Determinants of Health     Financial Resource Strain: Not on file   Food Insecurity: Not on file   Transportation Needs: Not on file   Physical Activity: Not on file   Stress: Not on file   Social Connections: Not on file   Intimate Partner Violence: Not on file   Housing Stability: Not on file       Family History:  Family History   Problem Relation Age of Onset    Cancer Mother     Scoliosis Paternal Grandmother     Diabetes Paternal Grandfather          REVIEW OF SYSTEMS:   CONSTITUTIONAL: Denies unexplained weight loss, fevers, chills or fatigue  NEUROLOGICAL: Denies unsteady gait or progressive weakness    PSYCHOLOGICAL: Denies anxiety, depression SKIN: Denies skin changes, delayed healing, rash, itching   HEMATOLOGIC: Denies easy bleeding or bruising  ENDOCRINE: Denies excessive thirst, urination, heat/cold  RESPIRATORY: Denies current dyspnea, cough  CARDIOVASCULAR: Negative for chest pain at this time. EYES: Negative for photophobia and visual disturbance. ENT:  Negative for rhinorrhea, epistaxis, sore throat, or hearing loss. GI: Denies nausea, vomiting, diarrhea   : Denies bowel or bladder issues   MUSCULOSKELETAL: Right thumb and left shoulder pain. All other ROS reviewed in chart or with patient or family and are grossly negative. PHYSICAL EXAMINATION:  Ms. Gamal Murphy is a very pleasant 80 y.o. female who seen today in no acute distress, awake, alert, and oriented. She is well nourished and groomed. Patient with normal affect. Body mass index is 29.3 kg/m². . Skin warm and dry. Resting respiratory rate is 16. Resp deep and easy. Pulse is with regular rate and rhythm    /82   Pulse 80   Temp 97.8 °F (36.6 °C) (Oral)   Resp 18   Ht 5' 5\" (1.651 m)   Wt 176 lb 1.6 oz (79.9 kg)   SpO2 100%   BMI 29.30 kg/m²        Airway is intact  Chest: chest clear, no wheezing, rales, normal symmetric air entry, no tachypnea, retractions or cyanosis  Heart: regular rate and rhythm ; heart sounds normal   Hearing intact, pupil equal and reactive bilateral  Lymphatics; No groin or axillary enlarged lymph nodes. Neck; No swelling  Abdomen; soft, non distended. MUSCULOSKELETAL:   Examination of both Upper extremities showing a decreased range of motion of the right thumb compare to the other side. There is moderate swelling that can be seen, as well as open wound of the thumb. She has intact sensation and good radial pulses. She has significant tenderness on deep palpation over the distal phalanx of the thumb right hand. There is no rotational deformity of the right thumb. There is moderate swelling and minimal ecchymosis left shoulder.   She is tender to palpation over the shoulder, and otherwise non tender over the remainder of the extremity. Range of motion is decreased secondary to pain over the left shoulder. The skin overlying the left shoulder is intact without evidence of lesion, laceration. Distal pulses are 2+ and symmetric bilaterally. Sensation is grossly intact to light touch and symmetric bilaterally. NEUROLOGIC:   Sensory:    Touch:                     Right Upper Extremity:  normal                   Left Upper Extremity:  normal                  Right Lower Extremity:  normal                  Left Lower Extremity:  normal                  DATA:    CBC:   Lab Results   Component Value Date/Time    WBC 7.8 03/07/2023 03:12 PM    RBC 4.24 03/07/2023 03:12 PM    HGB 14.0 03/07/2023 03:12 PM    HCT 41.9 03/07/2023 03:12 PM    MCV 98.9 03/07/2023 03:12 PM    MCH 33.1 03/07/2023 03:12 PM    MCHC 33.5 03/07/2023 03:12 PM    RDW 13.1 03/07/2023 03:12 PM     03/07/2023 03:12 PM    MPV 9.2 03/07/2023 03:12 PM     WBC:    Lab Results   Component Value Date/Time    WBC 7.8 03/07/2023 03:12 PM     PT/INR:    Lab Results   Component Value Date/Time    PROTIME 12.8 03/07/2023 03:12 PM    INR 0.97 03/07/2023 03:12 PM     PTT:  No results found for: APTT[APTT    IMAGING:  Xray's were reviewed, dated 3/7/2023,  3 views of the right hand, and showed a thumb distal phalanx base minimally displaced fracture. Xrays dated 3/7/2023, 3 views of left shoulder were reviewed, and showed moderately displaced proximal humerus fracture. IMPRESSION:  1- Right hand pain/ thumb distal phalanx base open fracture. 2- Left shoulder pain/ moderately displaced proximal humerus fracture. PLAN:  I discussed with Ruby Ferrari the overall alignment of the right thumb open fracture and treatment options including both surgical and non-surgical treatment, and that my recommendation is an urgent I&D open thumb fracture.      I discussed the risks and benefits of surgery with the patient, including but not limited to infection, bleeding, pain, injury to nerves or blood vessels failure of the surgery and need for additional surgery. All the patient's questions were answered. We discussed an expected post-operative course. She  is understanding of this and wishes to proceed. Regarding her left proximal humerus, we may consider ORIF the next week as outpatient if still indicated. Thank you very much for the kind consultation and allowing me to participate in this patient's care. I will continue to keep you apprised of her progress.         Tony Mares MD   3/7/2023  6:13 PM

## 2023-03-07 NOTE — ANESTHESIA PRE PROCEDURE
Department of Anesthesiology  Preprocedure Note       Name:  Evone Sandifer   Age:  80 y.o.  :  1935                                          MRN:  [id number]         Date:  3/7/2023      Surgeon: Haleigh Duenas):  Mariana Du MD    Procedure: Procedure(s):  DEBRIDEMENT INCISION AND DRAINAGE OF RIGHT THUMB    Medications prior to admission:   Prior to Admission medications    Medication Sig Start Date End Date Taking?  Authorizing Provider   atorvastatin (LIPITOR) 40 MG tablet Take 1 tablet by mouth at bedtime 23  Yes Historical Provider, MD   carvedilol (COREG) 3.125 MG tablet Take 1 tablet by mouth 2 times daily (with meals) 22  Yes Historical Provider, MD   cloNIDine (CATAPRES) 0.1 MG tablet Take 1 tablet by mouth 2 times daily 10/13/20  Yes Historical Provider, MD   aspirin 81 MG EC tablet Take 1 tablet by mouth Twice a Week    Historical Provider, MD   famotidine (PEPCID) 20 MG tablet Take 1 tablet by mouth nightly as needed    Historical Provider, MD Rosado Sabal 200-62.5-25 MCG/ACT AEPB inhaler Inhale 1 spray into the lungs 2 times daily 3/4/23   Historical Provider, MD   NIFEdipine (ADALAT CC) 60 MG extended release tablet Take 1 tablet by mouth daily 23   Historical Provider, MD   spironolactone (ALDACTONE) 25 MG tablet Take 1 tablet by mouth daily 23   Historical Provider, MD   valsartan (DIOVAN) 320 MG tablet Take 1 tablet by mouth daily 2/15/23   Historical Provider, MD   PARoxetine (PAXIL) 20 MG tablet  3/25/16   Historical Provider, MD   hydrochlorothiazide (HYDRODIURIL) 25 MG tablet  16   Historical Provider, MD   fluorometholone (FML) 0.1 % ophthalmic suspension  3/31/16   Historical Provider, MD   acetaminophen-codeine (TYLENOL/CODEINE #3) 300-30 MG per tablet Take 1 tablet by mouth every 4 hours as needed for Pain 16   Laquita Barrera MD       Current medications:    Current Facility-Administered Medications   Medication Dose Route Frequency Provider Last Rate Last Admin    atorvastatin (LIPITOR) tablet 40 mg  40 mg Oral Nightly Angelita Banegas MD        carvedilol (COREG) tablet 3.125 mg  3.125 mg Oral BID  Alejandro Vallejo MD        cloNIDine (CATAPRES) tablet 0.1 mg  0.1 mg Oral BID Alejandro Vallejo MD        famotidine (PEPCID) tablet 20 mg  20 mg Oral Nightly PRN Alejandro Vallejo MD        NIFEdipine (ADALAT CC) extended release tablet 60 mg  60 mg Oral Daily Alejandro Vallejo MD        spironolactone (ALDACTONE) tablet 25 mg  25 mg Oral Daily Alejandro Vallejo MD        fluticasone-umeclidin-vilant (TRELEGY ELLIPTA) 200-62.5-25 MCG/ACT inhaler 1 puff  1 puff Inhalation BID Alejandro Vallejo MD        valsartan (DIOVAN) tablet 320 mg  1 tablet Oral Daily Alejandro Vallejo MD        sodium chloride flush 0.9 % injection 5-40 mL  5-40 mL IntraVENous 2 times per day Alejandro Vallejo MD        sodium chloride flush 0.9 % injection 5-40 mL  5-40 mL IntraVENous PRN Alejandro Vallejo MD        0.9 % sodium chloride infusion   IntraVENous PRN Alejandro Vallejo MD        ondansetron (ZOFRAN-ODT) disintegrating tablet 4 mg  4 mg Oral Q8H PRN Alejandro Vallejo MD        Or    ondansetron (ZOFRAN) injection 4 mg  4 mg IntraVENous Q6H PRN Alejandro Vallejo MD        polyethylene glycol (GLYCOLAX) packet 17 g  17 g Oral Daily PRN Alejandro Vallejo MD        acetaminophen (TYLENOL) tablet 650 mg  650 mg Oral Q6H PRN Alejandro Vallejo MD        Or    acetaminophen (TYLENOL) suppository 650 mg  650 mg Rectal Q6H PRN Angelita Banegas MD        0.9 % sodium chloride infusion   IntraVENous Continuous Alejandro Vallejo MD 75 mL/hr at 03/07/23 1649 New Bag at 03/07/23 1649    potassium chloride (KLOR-CON M) extended release tablet 40 mEq  40 mEq Oral PRN Alejandro Vallejo MD        Or    potassium bicarb-citric acid (EFFER-K) effervescent tablet 40 mEq  40 mEq Oral PRN Alejandro Vallejo MD        Or    potassium chloride 10 mEq/100 mL IVPB (Peripheral Line)  10 mEq IntraVENous PRN Alejandro Vallejo MD        magnesium sulfate 2000 mg in 50 mL IVPB premix  2,000 mg IntraVENous PRN Alejandro Vallejo MD        senna (SENOKOT) 8.8 MG/5ML syrup 8.8 mg  5 mL Oral BID PRN Alejandro Vallejo MD        morphine (PF) injection 2 mg  2 mg IntraVENous Q4H PRN Alejandro Vallejo MD        HYDROcodone-acetaminophen (NORCO) 5-325 MG per tablet 1 tablet  1 tablet Oral Q4H PRN Alejandro Vallejo MD       Marcie Alu [START ON 3/8/2023] PARoxetine (PAXIL) tablet 20 mg  20 mg Oral Daily Alejandro Vallejo MD         Current Outpatient Medications   Medication Sig Dispense Refill    atorvastatin (LIPITOR) 40 MG tablet Take 1 tablet by mouth at bedtime      carvedilol (COREG) 3.125 MG tablet Take 1 tablet by mouth 2 times daily (with meals)      cloNIDine (CATAPRES) 0.1 MG tablet Take 1 tablet by mouth 2 times daily      aspirin 81 MG EC tablet Take 1 tablet by mouth Twice a Week      famotidine (PEPCID) 20 MG tablet Take 1 tablet by mouth nightly as needed      TRELEGY ELLIPTA 200-62.5-25 MCG/ACT AEPB inhaler Inhale 1 spray into the lungs 2 times daily      NIFEdipine (ADALAT CC) 60 MG extended release tablet Take 1 tablet by mouth daily      spironolactone (ALDACTONE) 25 MG tablet Take 1 tablet by mouth daily      valsartan (DIOVAN) 320 MG tablet Take 1 tablet by mouth daily      PARoxetine (PAXIL) 20 MG tablet       hydrochlorothiazide (HYDRODIURIL) 25 MG tablet       fluorometholone (FML) 0.1 % ophthalmic suspension       acetaminophen-codeine (TYLENOL/CODEINE #3) 300-30 MG per tablet Take 1 tablet by mouth every 4 hours as needed for Pain 30 tablet 0       Allergies:     Allergies   Allergen Reactions    Penicillins Hives    Sulfa Antibiotics Hives       Problem List:    Patient Active Problem List   Diagnosis Code    Tibialis tendinitis M76.829    Contusion, ankle S90.00XA    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    DDD (degenerative disc disease), thoracolumbar M51.35    Degenerative scoliosis in adult patient M41.50    Fx humeral neck, left, closed, initial encounter S42.212A       Past Medical History:        Diagnosis Date    Arthritis     Fractures     Osteoarthritis     Osteoporosis        Past Surgical History:        Procedure Laterality Date    HIP SURGERY         Social History:    Social History     Tobacco Use    Smoking status: Former    Smokeless tobacco: Never   Substance Use Topics    Alcohol use: Yes     Alcohol/week: 1.0 standard drink     Types: 1 Glasses of wine per week                                Counseling given: Not Answered      Vital Signs (Current):   Vitals:    03/07/23 1444 03/07/23 1639 03/07/23 1641 03/07/23 1730   BP:   127/73 137/82   Pulse:   78 80   Resp: 18  18 18   Temp:  97.8 °F (36.6 °C)     TempSrc:  Oral     SpO2:   93% 100%   Weight:       Height:                                                  BP Readings from Last 3 Encounters:   03/07/23 137/82   12/03/13 127/67   11/12/13 132/70       NPO Status:                                                                                 BMI:   Wt Readings from Last 3 Encounters:   03/07/23 176 lb 1.6 oz (79.9 kg)   04/20/16 172 lb 8 oz (78.2 kg)   12/03/13 156 lb (70.8 kg)     Body mass index is 29.3 kg/m².     CBC:   Lab Results   Component Value Date/Time    WBC 7.8 03/07/2023 03:12 PM    RBC 4.24 03/07/2023 03:12 PM    HGB 14.0 03/07/2023 03:12 PM    HCT 41.9 03/07/2023 03:12 PM    MCV 98.9 03/07/2023 03:12 PM    RDW 13.1 03/07/2023 03:12 PM     03/07/2023 03:12 PM       CMP:   Lab Results   Component Value Date/Time     03/07/2023 03:12 PM    K 4.4 03/07/2023 03:12 PM    CL 93 03/07/2023 03:12 PM    CO2 26 03/07/2023 03:12 PM    BUN 31 03/07/2023 03:12 PM    CREATININE 0.7 03/07/2023 03:12 PM    LABGLOM >60 03/07/2023 03:12 PM    GLUCOSE 126 03/07/2023 03:12 PM    CALCIUM 9.9 03/07/2023 03:12 PM       POC Tests: No results for input(s): POCGLU, POCNA, POCK, POCCL, POCBUN, POCHEMO, POCHCT in the last 72 hours.    Coags:   Lab Results   Component Value Date/Time    PROTIME 12.8 03/07/2023 03:12 PM    INR 0.97 03/07/2023 03:12 PM       HCG (If Applicable): No results found for: PREGTESTUR, PREGSERUM, HCG, HCGQUANT     ABGs: No results found for: PHART, PO2ART, EWU4UGZ, KZA9LMD, BEART, Z8OTHJGW     Type & Screen (If Applicable):  No results found for: LABABO, LABRH    Drug/Infectious Status (If Applicable):  No results found for: HIV, HEPCAB    COVID-19 Screening (If Applicable): No results found for: COVID19        Anesthesia Evaluation    Airway: Mallampati: II  TM distance: >3 FB   Neck ROM: full  Mouth opening: > = 3 FB   Dental:          Pulmonary:                              Cardiovascular:            Rhythm: regular  Rate: normal                    Neuro/Psych:               GI/Hepatic/Renal:             Endo/Other:                     Abdominal:             Vascular:          Other Findings:           Anesthesia Plan      general     ASA 2 - emergent       Induction: intravenous.      Anesthetic plan and risks discussed with patient.      Plan discussed with CRNA.                    ANIRUDH DOUGHERTY MD   3/7/2023

## 2023-03-07 NOTE — ED PROVIDER NOTES
[id number] [facility]          ATTENDING PHYSICIAN NOTE       Date of evaluation: 3/7/2023    Chief Complaint     Fall (Pt tripped and landed on her left shoulder. Laceration on right thumb and upper lip.)      History of Present Illness     Shabana Calle is a 80 y.o. female who presents to the emergency department today after a fall with right hand and left shoulder pain. Patient states she was walking into a building for [employer] when she tripped on an uneven piece of concrete and fell onto her left shoulder as well as an outstretched right hand. No preceding lightheadedness or dizziness. She is right-hand dominant. She noticed a large laceration to her right thumb and the bone sticking out. EMS was called. She did hit her face and head but did not lose consciousness. She is not on anticoagulation. She denies pain elsewhere such as in her chest, abdomen, back, or hips. No lower extremity pain. Typically ambulates with a cane in her right hand. Physical Exam     INITIAL VITALS: BP: 123/66, Temp: 97.8 °F (36.6 °C), Heart Rate: 69, Resp: 18, SpO2: 98 %   Physical Exam      Nursing note and vitals reviewed. General:  Adult female, alert and appropriately interactive. In no distress. HENT: Periorbital ecchymosis with tenderness in the left inferior periorbital region without crepitus or step-off. External ears normal. Nose appears normal externally. Abrasions and small superficial laceration of the upper lip without subcutaneous tissue exposed or transection of the vermilion border. Eyes: Conjunctivae normal. No scleral icterus. PERRL, EOMI, no nystagmus. Neck: Neck supple. No tracheal deviation present. No midline C-spine tenderness. CV: Normal rate. Regular rhythm. S1/S2 auscultated. No murmurs, gallops or rubs. 2+ bilateral radial pulses. Pulm: Effort normal. Breath sounds clear to auscultation bilaterally. No wheezes. No rales or rhonchi.   No chest wall tenderness or crepitus. GI: Soft. No distension. No tenderness. No rebound or guarding. No masses. No peritoneal signs. Musculoskeletal: Open fracture of the right thumb near the IP joint on the volar aspect, no exposed bone currently. Left humeral head tenderness without gross deformity. No instability of the pelvis or tenderness with logroll bilateral legs. No lower extremity tenderness or wounds. Neurological: Alert and appropriately interactive. Face symmetric, speech without dysarthria or obvious aphasia. Moving all extremities spontaneously. Sensation grossly intact in bilateral hands including distal to open thumb fracture on right. Skin: Open fracture laceration on right thumb as above. Warm, dry. No rash. No diaphoresis or erythema. Psychiatric: Calm and cooperative with appropriate mood and affect. ASSESSMENT / PLAN  (MEDICAL DECISION MAKING)   Laurent Pittman is a 80 y.o. female who presents to the emergency department today for a trip and fall. She has an open fracture to the right thumb near the IP joint as well as other areas of ecchymosis and tenderness which were all imaged. The fracture of her right thumb is noted as well as a humeral head fracture on the left. As such she unfortunately does not have much functional use of either upper extremity at this time. Head and facial CTs without acute emergent findings. I contacted the orthopedist on-call, Dr. Alexander Rodgers, who has graciously agreed to take the patient to the operating room for washout of the open fracture and probable pinning of the fracture. Patient was empirically given clindamycin for the open fracture given multiple other significant allergies including to penicillins, sulfas, and possibly cephalosporins. The left arm has been placed in a sling for comfort. Her tetanus immunization is up-to-date.   Patient discussed with the hospitalist for admission as she will require intensive PT/OT given the bilateral upper extremity injuries. Medical Decision Making  Amount and/or Complexity of Data Reviewed  Labs: ordered. Radiology: ordered. ECG/medicine tests: ordered. Discussion of management or test interpretation with external provider(s): Orthopedics    Risk  OTC drugs. Prescription drug management. Decision regarding hospitalization. Minor surgery with identified risk factors. Clinical Impression     1. Other closed displaced fracture of proximal end of left humerus, initial encounter    2. Open displaced fracture of proximal phalanx of right thumb, initial encounter    3. Fall from standing, initial encounter        Disposition     PATIENT REFERRED TO:  No follow-up provider specified. DISCHARGE MEDICATIONS:  New Prescriptions    No medications on file       DISPOSITION Admitted 03/07/2023 03:25:44 PM        Abi Diaz MD                 Diagnostic Results and Other Data         RADIOLOGY:  XR SHOULDER LEFT (MIN 2 VIEWS)   Final Result   1. Acute minimally displaced fracture of the left humeral neck. XR FINGER RIGHT (MIN 2 VIEWS)   Final Result   1. Acute fracture of the base of the first digit distal phalanx. XR CHEST PORTABLE   Final Result   1. Small bilateral pleural effusions. 2.  Acute fracture of the left humeral neck      CT MAXILLOFACIAL WO CONTRAST   Final Result   HEAD:   Small midline/left anterior frontal scalp hematoma with no underlying calvarial fracture, intrarenal hemorrhage, or mass effect. Mild atrophy and chronic small vessel ischemic change. FACE:   Mild-to-moderate left infraorbital/upper cheek swelling, with no underlying acute facial fracture. CT Head W/O Contrast   Final Result   HEAD:   Small midline/left anterior frontal scalp hematoma with no underlying calvarial fracture, intrarenal hemorrhage, or mass effect. Mild atrophy and chronic small vessel ischemic change.       FACE:   Mild-to-moderate left infraorbital/upper cheek swelling, with no underlying acute facial fracture. LABS:   Results for orders placed or performed during the hospital encounter of 03/07/23   CBC with Auto Differential   Result Value Ref Range    WBC 7.8 4.0 - 11.0 K/uL    RBC 4.24 4.00 - 5.20 M/uL    Hemoglobin 14.0 12.0 - 16.0 g/dL    Hematocrit 41.9 36.0 - 48.0 %    MCV 98.9 80.0 - 100.0 fL    MCH 33.1 26.0 - 34.0 pg    MCHC 33.5 31.0 - 36.0 g/dL    RDW 13.1 12.4 - 15.4 %    Platelets 106 978 - 994 K/uL    MPV 9.2 5.0 - 10.5 fL    Neutrophils % 75.6 %    Lymphocytes % 12.0 %    Monocytes % 10.3 %    Eosinophils % 1.7 %    Basophils % 0.4 %    Neutrophils Absolute 5.9 1.7 - 7.7 K/uL    Lymphocytes Absolute 0.9 (L) 1.0 - 5.1 K/uL    Monocytes Absolute 0.8 0.0 - 1.3 K/uL    Eosinophils Absolute 0.1 0.0 - 0.6 K/uL    Basophils Absolute 0.0 0.0 - 0.2 K/uL   BMP w/ Reflex to MG   Result Value Ref Range    Sodium 130 (L) 136 - 145 mmol/L    Potassium reflex Magnesium 4.4 3.5 - 5.1 mmol/L    Chloride 93 (L) 99 - 110 mmol/L    CO2 26 21 - 32 mmol/L    Anion Gap 11 3 - 16    Glucose 126 (H) 70 - 99 mg/dL    BUN 31 (H) 7 - 20 mg/dL    Creatinine 0.7 0.6 - 1.2 mg/dL    Est, Glom Filt Rate >60 >60    Calcium 9.9 8.3 - 10.6 mg/dL   Protime-INR   Result Value Ref Range    Protime 12.8 11.7 - 14.5 sec    INR 0.97 0.87 - 1.14   EKG 12 Lead   Result Value Ref Range    Ventricular Rate 68 BPM    Atrial Rate 68 BPM    P-R Interval 236 ms    QRS Duration 72 ms    Q-T Interval 424 ms    QTc Calculation (Bazett) 450 ms    P Axis 56 degrees    R Axis -5 degrees    T Axis 35 degrees    Diagnosis       EKG performed in ER and to be interpreted by ER physician. Confirmed by MD, ER ([id number]),  Erasto Wright ([phone]) on 3/7/2023 3:43:30 PM   TYPE AND SCREEN   Result Value Ref Range    ABO/Rh A POS     Antibody Screen NEG        MOST RECENT VITALS:  BP: 127/73,Temp: 97.8 °F (36.6 °C), Heart Rate: 78, Resp: 18, SpO2: 93 %     Procedures     Procedures    ED Course     Nursing Notes, Past Medical Hx, Past Surgical Hx, Social Hx,Allergies, and Family Hx were reviewed. The patient was given the following medications:  Orders Placed This Encounter   Medications    oxyCODONE (ROXICODONE) immediate release tablet 5 mg    lidocaine 1 % injection 5 mL    acetaminophen (TYLENOL) tablet 1,000 mg    clindamycin (CLEOCIN) capsule 300 mg     Order Specific Question:   Antimicrobial Indications     Answer:   Bone and Joint Infection    atorvastatin (LIPITOR) tablet 40 mg    carvedilol (COREG) tablet 3.125 mg    cloNIDine (CATAPRES) tablet 0.1 mg    famotidine (PEPCID) tablet 20 mg    NIFEdipine (ADALAT CC) extended release tablet 60 mg    spironolactone (ALDACTONE) tablet 25 mg    fluticasone-umeclidin-vilant (TRELEGY ELLIPTA) 200-62.5-25 MCG/ACT inhaler 1 puff    valsartan (DIOVAN) tablet 320 mg    sodium chloride flush 0.9 % injection 5-40 mL    sodium chloride flush 0.9 % injection 5-40 mL    0.9 % sodium chloride infusion    OR Linked Order Group     ondansetron (ZOFRAN-ODT) disintegrating tablet 4 mg     ondansetron (ZOFRAN) injection 4 mg    polyethylene glycol (GLYCOLAX) packet 17 g    OR Linked Order Group     acetaminophen (TYLENOL) tablet 650 mg     acetaminophen (TYLENOL) suppository 650 mg    0.9 % sodium chloride infusion    OR Linked Order Group     potassium chloride (KLOR-CON M) extended release tablet 40 mEq     potassium bicarb-citric acid (EFFER-K) effervescent tablet 40 mEq     potassium chloride 10 mEq/100 mL IVPB (Peripheral Line)    magnesium sulfate 2000 mg in 50 mL IVPB premix    senna (SENOKOT) 8.8 MG/5ML syrup 8.8 mg    morphine (PF) injection 2 mg    HYDROcodone-acetaminophen (NORCO) 5-325 MG per tablet 1 tablet    PARoxetine (PAXIL) tablet 20 mg       CONSULTS:  IP CONSULT TO ORTHOPEDIC SURGERY  IP CONSULT TO HOSPITALIST    Review of Systems     Pertinent positive and negative findings as documented in the HPI.     Past Medical, Surgical, Family, and Social History     She has a past medical history of Arthritis, Fractures, Osteoarthritis, and Osteoporosis. She has a past surgical history that includes hip surgery. Her family history includes Cancer in her mother; Diabetes in her paternal grandfather; Scoliosis in her paternal grandmother. She reports that she has quit smoking. She has never used smokeless tobacco. She reports current alcohol use of about 1.0 standard drink per week. She reports that she does not use drugs. Medications     Previous Medications    ACETAMINOPHEN-CODEINE (TYLENOL/CODEINE #3) 300-30 MG PER TABLET    Take 1 tablet by mouth every 4 hours as needed for Pain    ASPIRIN 81 MG EC TABLET    Take 1 tablet by mouth Twice a Week    ATORVASTATIN (LIPITOR) 40 MG TABLET    Take 1 tablet by mouth at bedtime    CARVEDILOL (COREG) 3.125 MG TABLET    Take 1 tablet by mouth 2 times daily (with meals)    CLONIDINE (CATAPRES) 0.1 MG TABLET    Take 1 tablet by mouth 2 times daily    FAMOTIDINE (PEPCID) 20 MG TABLET    Take 1 tablet by mouth nightly as needed    FLUOROMETHOLONE (FML) 0.1 % OPHTHALMIC SUSPENSION        HYDROCHLOROTHIAZIDE (HYDRODIURIL) 25 MG TABLET        NIFEDIPINE (ADALAT CC) 60 MG EXTENDED RELEASE TABLET    Take 1 tablet by mouth daily    PAROXETINE (PAXIL) 20 MG TABLET        SPIRONOLACTONE (ALDACTONE) 25 MG TABLET    Take 1 tablet by mouth daily    TRELEGY ELLIPTA 200-62.5-25 MCG/ACT AEPB INHALER    Inhale 1 spray into the lungs 2 times daily    VALSARTAN (DIOVAN) 320 MG TABLET    Take 1 tablet by mouth daily       Allergies     She is allergic to penicillins and sulfa antibiotics.                     Juan Mckeon MD  03/07/23 7000

## 2023-03-07 NOTE — ED NOTES
ED TO INPATIENT SBAR HANDOFF    Patient Name: Serjio Esqueda   :  1935  80 y.o. MRN:  [id number]  Preferred Name    ED Room #:  L86/I08-03  Family/Caregiver Present no   Restraints no   Sitter no   Sepsis Risk Score Sepsis Risk Score: 0.55    Situation  Code Status: Full Code No additional code details. Allergies: Penicillins and Sulfa antibiotics  Weight: Patient Vitals for the past 96 hrs (Last 3 readings):   Weight   23 1349 176 lb 1.6 oz (79.9 kg)     Arrived from: home  Chief Complaint:   Chief Complaint   Patient presents with    Fall     Pt tripped and landed on her left shoulder. Laceration on right thumb and upper lip. Hospital Problem/Diagnosis:  Principal Problem:    Fx humeral neck, left, closed, initial encounter  Resolved Problems:    * No resolved hospital problems. *    Imaging:   XR SHOULDER LEFT (MIN 2 VIEWS)   Final Result   1. Acute minimally displaced fracture of the left humeral neck. XR FINGER RIGHT (MIN 2 VIEWS)   Final Result   1. Acute fracture of the base of the first digit distal phalanx. XR CHEST PORTABLE   Final Result   1. Small bilateral pleural effusions. 2.  Acute fracture of the left humeral neck      CT MAXILLOFACIAL WO CONTRAST   Final Result   HEAD:   Small midline/left anterior frontal scalp hematoma with no underlying calvarial fracture, intrarenal hemorrhage, or mass effect. Mild atrophy and chronic small vessel ischemic change. FACE:   Mild-to-moderate left infraorbital/upper cheek swelling, with no underlying acute facial fracture. CT Head W/O Contrast   Final Result   HEAD:   Small midline/left anterior frontal scalp hematoma with no underlying calvarial fracture, intrarenal hemorrhage, or mass effect. Mild atrophy and chronic small vessel ischemic change. FACE:   Mild-to-moderate left infraorbital/upper cheek swelling, with no underlying acute facial fracture.             Abnormal labs:   Abnormal Labs Reviewed   CBC WITH AUTO DIFFERENTIAL - Abnormal; Notable for the following components:       Result Value    Lymphocytes Absolute 0.9 (*)     All other components within normal limits   BASIC METABOLIC PANEL W/ REFLEX TO MG FOR LOW K - Abnormal; Notable for the following components:    Sodium 130 (*)     Chloride 93 (*)     Glucose 126 (*)     BUN 31 (*)     All other components within normal limits     Critical values: no     Abnormal Assessment Findings: Broken left shoulder    Background  History:   Past Medical History:   Diagnosis Date    Arthritis     Fractures     Osteoarthritis     Osteoporosis        Assessment    Vitals/MEWS:    Level of Consciousness: Alert (0)   Vitals:    03/07/23 1444 03/07/23 1639 03/07/23 1641 03/07/23 1730   BP:   127/73 137/82   Pulse:   78 80   Resp: 18  18 18   Temp:  97.8 °F (36.6 °C)     TempSrc:  Oral     SpO2:   93% 100%   Weight:       Height:         FiO2 (%):   O2 Flow Rate:      Cardiac Rhythm:    Pain Assessment:  [] Verbal [] Reyne Boxer Scale  Pain Scale: Pain Assessment  Pain Assessment: 0-10  Pain Level: 6  Patient's Stated Pain Goal: 0 - No pain  Pain Location: Shoulder  Pain Orientation: Left  Pain Descriptors: Aching  Last documented pain score (0-10 scale) Pain Level: 6  Last documented pain medication administered: Oxycodone @1444, Tylenol @1443  Mental Status: alert  Orientation Level:    NIH Score:    C-SSRS: Risk of Suicide: No Risk  Bedside swallow:    Sander Coma Scale (GCS): Sander Coma Scale  Eye Opening: Spontaneous  Best Verbal Response: Oriented  Best Motor Response: Obeys commands  [place] Coma Scale Score: 15  Active LDA's:   Peripheral IV 03/07/23 Right Antecubital (Active)   Site Assessment Clean, dry & intact 03/07/23 1511   Line Status Blood return noted; Flushed;Specimen collected;Normal saline locked 03/07/23 1511   Dressing Type Transparent 03/07/23 1511   Dressing Intervention New 03/07/23 1511     PO Status: Nothing by Mouth  Pertinent or High Risk Medications/Drips: no   o If Yes, please provide details:  Pending Blood Product Administration: no       You may also review the ED PT Care Timeline found under the Summary Nursing Index tab. Recommendation    Pending orders   Plan for Discharge (if known):    Additional Comments: Pt alert and oriented, uses cane for walking   If any further questions, please call Sending RN at [phone]    Electronically signed by: Electronically signed by Scott Douglass RN on 3/7/2023 at 5:48 PM     Scott Douglass RN  03/07/23 2736

## 2023-03-08 LAB
ANION GAP SERPL CALCULATED.3IONS-SCNC: 9 MMOL/L (ref 3–16)
BUN BLDV-MCNC: 17 MG/DL (ref 7–20)
CALCIUM SERPL-MCNC: 8.7 MG/DL (ref 8.3–10.6)
CHLORIDE BLD-SCNC: 99 MMOL/L (ref 99–110)
CO2: 25 MMOL/L (ref 21–32)
CREAT SERPL-MCNC: 0.6 MG/DL (ref 0.6–1.2)
GFR SERPL CREATININE-BSD FRML MDRD: >60 ML/MIN/{1.73_M2}
GLUCOSE BLD-MCNC: 109 MG/DL (ref 70–99)
HCT VFR BLD CALC: 38.9 % (ref 36–48)
HEMOGLOBIN: 12.9 G/DL (ref 12–16)
MCH RBC QN AUTO: 33 PG (ref 26–34)
MCHC RBC AUTO-ENTMCNC: 33.1 G/DL (ref 31–36)
MCV RBC AUTO: 99.8 FL (ref 80–100)
PDW BLD-RTO: 12.9 % (ref 12.4–15.4)
PLATELET # BLD: 182 K/UL (ref 135–450)
PMV BLD AUTO: 9.4 FL (ref 5–10.5)
POTASSIUM REFLEX MAGNESIUM: 4.2 MMOL/L (ref 3.5–5.1)
RBC # BLD: 3.89 M/UL (ref 4–5.2)
SODIUM BLD-SCNC: 133 MMOL/L (ref 136–145)
WBC # BLD: 6.8 K/UL (ref 4–11)

## 2023-03-08 PROCEDURE — 94664 DEMO&/EVAL PT USE INHALER: CPT

## 2023-03-08 PROCEDURE — 6370000000 HC RX 637 (ALT 250 FOR IP): Performed by: ORTHOPAEDIC SURGERY

## 2023-03-08 PROCEDURE — 36415 COLL VENOUS BLD VENIPUNCTURE: CPT

## 2023-03-08 PROCEDURE — 1200000000 HC SEMI PRIVATE

## 2023-03-08 PROCEDURE — 96374 THER/PROPH/DIAG INJ IV PUSH: CPT

## 2023-03-08 PROCEDURE — 97535 SELF CARE MNGMENT TRAINING: CPT

## 2023-03-08 PROCEDURE — 97116 GAIT TRAINING THERAPY: CPT

## 2023-03-08 PROCEDURE — 97530 THERAPEUTIC ACTIVITIES: CPT

## 2023-03-08 PROCEDURE — 80048 BASIC METABOLIC PNL TOTAL CA: CPT

## 2023-03-08 PROCEDURE — 97166 OT EVAL MOD COMPLEX 45 MIN: CPT

## 2023-03-08 PROCEDURE — 97162 PT EVAL MOD COMPLEX 30 MIN: CPT

## 2023-03-08 PROCEDURE — 6360000002 HC RX W HCPCS: Performed by: INTERNAL MEDICINE

## 2023-03-08 PROCEDURE — 94640 AIRWAY INHALATION TREATMENT: CPT

## 2023-03-08 PROCEDURE — 6370000000 HC RX 637 (ALT 250 FOR IP): Performed by: INTERNAL MEDICINE

## 2023-03-08 PROCEDURE — 2580000003 HC RX 258: Performed by: ORTHOPAEDIC SURGERY

## 2023-03-08 PROCEDURE — 85027 COMPLETE CBC AUTOMATED: CPT

## 2023-03-08 PROCEDURE — 6360000002 HC RX W HCPCS: Performed by: ORTHOPAEDIC SURGERY

## 2023-03-08 RX ADMIN — CARVEDILOL 3.12 MG: 6.25 TABLET, FILM COATED ORAL at 08:58

## 2023-03-08 RX ADMIN — NIFEDIPINE 60 MG: 60 TABLET, FILM COATED, EXTENDED RELEASE ORAL at 21:09

## 2023-03-08 RX ADMIN — NIFEDIPINE 60 MG: 60 TABLET, FILM COATED, EXTENDED RELEASE ORAL at 08:57

## 2023-03-08 RX ADMIN — HYDROCODONE BITARTRATE AND ACETAMINOPHEN 1 TABLET: 5; 325 TABLET ORAL at 13:25

## 2023-03-08 RX ADMIN — CEFAZOLIN 2000 MG: 2 INJECTION, POWDER, FOR SOLUTION INTRAMUSCULAR; INTRAVENOUS at 13:29

## 2023-03-08 RX ADMIN — BUDESONIDE 500 MCG: 0.5 INHALANT RESPIRATORY (INHALATION) at 10:55

## 2023-03-08 RX ADMIN — PAROXETINE HYDROCHLORIDE 20 MG: 20 TABLET, FILM COATED ORAL at 08:58

## 2023-03-08 RX ADMIN — CARVEDILOL 3.12 MG: 6.25 TABLET, FILM COATED ORAL at 17:32

## 2023-03-08 RX ADMIN — HYDROCODONE BITARTRATE AND ACETAMINOPHEN 1 TABLET: 5; 325 TABLET ORAL at 08:58

## 2023-03-08 RX ADMIN — CEFAZOLIN 2000 MG: 2 INJECTION, POWDER, FOR SOLUTION INTRAMUSCULAR; INTRAVENOUS at 05:22

## 2023-03-08 RX ADMIN — VALSARTAN 320 MG: 320 TABLET, FILM COATED ORAL at 22:16

## 2023-03-08 RX ADMIN — HYDROCODONE BITARTRATE AND ACETAMINOPHEN 1 TABLET: 5; 325 TABLET ORAL at 03:48

## 2023-03-08 RX ADMIN — SPIRONOLACTONE 25 MG: 25 TABLET, FILM COATED ORAL at 08:58

## 2023-03-08 RX ADMIN — ATORVASTATIN CALCIUM 40 MG: 40 TABLET, FILM COATED ORAL at 21:09

## 2023-03-08 RX ADMIN — MORPHINE SULFATE 2 MG: 2 INJECTION, SOLUTION INTRAMUSCULAR; INTRAVENOUS at 16:06

## 2023-03-08 RX ADMIN — SODIUM CHLORIDE, PRESERVATIVE FREE 10 ML: 5 INJECTION INTRAVENOUS at 21:13

## 2023-03-08 RX ADMIN — HYDROCODONE BITARTRATE AND ACETAMINOPHEN 1 TABLET: 5; 325 TABLET ORAL at 21:09

## 2023-03-08 RX ADMIN — TIOTROPIUM BROMIDE INHALATION SPRAY 2 PUFF: 3.12 SPRAY, METERED RESPIRATORY (INHALATION) at 10:55

## 2023-03-08 RX ADMIN — ARFORMOTEROL TARTRATE 15 MCG: 15 SOLUTION RESPIRATORY (INHALATION) at 10:55

## 2023-03-08 RX ADMIN — HYDROCODONE BITARTRATE AND ACETAMINOPHEN 1 TABLET: 5; 325 TABLET ORAL at 17:32

## 2023-03-08 ASSESSMENT — PAIN SCALES - GENERAL
PAINLEVEL_OUTOF10: 4
PAINLEVEL_OUTOF10: 7
PAINLEVEL_OUTOF10: 5
PAINLEVEL_OUTOF10: 4
PAINLEVEL_OUTOF10: 5
PAINLEVEL_OUTOF10: 4
PAINLEVEL_OUTOF10: 5
PAINLEVEL_OUTOF10: 6
PAINLEVEL_OUTOF10: 7
PAINLEVEL_OUTOF10: 4
PAINLEVEL_OUTOF10: 4
PAINLEVEL_OUTOF10: 8
PAINLEVEL_OUTOF10: 6

## 2023-03-08 ASSESSMENT — PAIN DESCRIPTION - ORIENTATION
ORIENTATION: RIGHT;LEFT
ORIENTATION: LEFT
ORIENTATION: RIGHT;LEFT
ORIENTATION: LEFT

## 2023-03-08 ASSESSMENT — PAIN DESCRIPTION - DESCRIPTORS
DESCRIPTORS: ACHING;THROBBING
DESCRIPTORS: ACHING;THROBBING
DESCRIPTORS: ACHING
DESCRIPTORS: SHARP;DISCOMFORT
DESCRIPTORS: ACHING;THROBBING
DESCRIPTORS: ACHING;THROBBING
DESCRIPTORS: ACHING;DISCOMFORT
DESCRIPTORS: ACHING
DESCRIPTORS: ACHING;THROBBING

## 2023-03-08 ASSESSMENT — PAIN - FUNCTIONAL ASSESSMENT
PAIN_FUNCTIONAL_ASSESSMENT: PREVENTS OR INTERFERES SOME ACTIVE ACTIVITIES AND ADLS

## 2023-03-08 ASSESSMENT — PAIN DESCRIPTION - LOCATION
LOCATION: SHOULDER;HAND
LOCATION: SHOULDER;HAND
LOCATION: ARM;SHOULDER
LOCATION: ELBOW;SHOULDER
LOCATION: SHOULDER
LOCATION: ELBOW
LOCATION: SHOULDER

## 2023-03-08 NOTE — CARE COORDINATION
Case Management Assessment  Initial Evaluation    Date/Time of Evaluation: 3/8/2023 4:01 PM  Assessment Completed by: LIN Pereira    If patient is discharged prior to next notation, then this note serves as note for discharge by case management. Patient Name: Jesi Jolly                   YOB: 1935  Diagnosis: Open displaced fracture of proximal phalanx of right thumb, initial encounter [S62.511B]  Fall from standing, initial encounter [W19. XXXA]  Fx humeral neck, left, closed, initial encounter Elisabeth Boas  Other closed displaced fracture of proximal end of left humerus, initial encounter Gita Ramirez                   Date / Time: 3/7/2023  1:31 PM    Patient Admission Status: Inpatient   Readmission Risk (Low < 19, Mod (19-27), High > 27): Readmission Risk Score: 10.9    Current PCP: ALESSANDRA Bourne CNP  PCP verified by CM? Chart Reviewed: Yes      History Provided by:    Patient Orientation:      Patient Cognition:      Hospitalization in the last 30 days (Readmission):  No    If yes, Readmission Assessment in CM Navigator will be completed. Advance Directives:      Code Status: Full Code   Patient's Primary Decision Maker is:        Discharge Planning:    Patient lives with: Spouse/Significant Other Type of Home: Other (Comment) (condo)  Primary Care Giver:    Patient Support Systems include: Spouse/Significant Other, Family Members   Current Financial resources:    Current community resources:    Current services prior to admission: None            Current DME:              Type of Home Care services:  Skilled Therapy, Nursing Services    ADLS  Prior functional level:    Current functional level:      PT AM-PAC: 17 /24  OT AM-PAC: 11 /24    Family can provide assistance at DC: Would you like Case Management to discuss the discharge plan with any other family members/significant others, and if so, who?     Plans to Return to Present Housing:    Other Identified Issues/Barriers to RETURNING to current housing: TBD    Potential Assistance needed at discharge: Home Care            Potential DME:    Patient expects to discharge to: [facility] for transportation at discharge:      Financial    Payor: [facility] MEDICARE / Plan: Rukhsana Nieves ESSENTIAL/PLUS / Product Type: *No Product type* /     Does insurance require precert for SNF: No    Potential assistance Purchasing Medications:    Meds-to-Beds request: Yes      [facility] [id number] - [address], [address]  [address] [facility] [address]  Phone: [phone] Fax: [phone]      Notes:    Factors facilitating achievement of predicted outcomes: Family support, Caregiver support, Motivated, Cooperative, Pleasant, and Sense of humor    Barriers to discharge: Pain and Upper extremity weakness    Additional Case Management Notes: SW met with patient and spouse at bedside. Patient is from home w/ spouse and is independent at baseline using a cane for balance when she would walk outside. SW discussed therapy scores with patient and spouse. SW provided patient and spouse a SNF list to look over and discussed the possibility of patient discharging to a SNF before returning home. Patient expressed that she doesn't want to go to a SNF. Patient's spouse encouraged patient to consider going to SNF and noted that he was not in a position to be patient's caretaker at home with her current medical needs. Patient agreed with spouse that she would struggle at home if she was discharged. Patient and spouse are going to pick 5 SNF choices for SW to send referrals to. Patient and spouse wanted time to pick their choices. SW to follow up with patient on SNF choices and send referrals. Patient and spouse have no more questions at this time and are planning on discharging to a SNF before returning home.      The Plan for Transition of Care is related to the following treatment goals of Open displaced fracture of proximal phalanx of right thumb, initial encounter [S62.511B]  Fall from standing, initial encounter [W19. XXXA]  Fx humeral neck, left, closed, initial encounter Star Miners  Other closed displaced fracture of proximal end of left humerus, initial encounter [O96.467B]    IF APPLICABLE: The Patient and/or patient representative Aisha Alcala and her family were provided with a choice of provider and agrees with the discharge plan. Freedom of choice list with basic dialogue that supports the patient's individualized plan of care/goals and shares the quality data associated with the providers was provided to:     Patient Representative Name:       The Patient and/or Patient Representative Agree with the Discharge Plan?       LIN Babcock  Case Management Department  Ph: [phone]

## 2023-03-08 NOTE — OP NOTE
[address]                [address]                                OPERATIVE REPORT    PATIENT NAME: Audra Escobar                      :        1935  MED REC NO:   [id number]                          ROOM:       5503  ACCOUNT NO:   [de-identified]                           ADMIT DATE: 2023  PROVIDER:     Rosy Mcmanus MD    DATE OF PROCEDURE:  2023    PRIMARY CARE:  Eva Bustillos NP.    SURGEON:  Rosy Mcmanus MD    ASSISTANT:  Jayla Fuentes, surgical assistant. PREOPERATIVE DIAGNOSIS:  Right thumb proximal phalanx intraarticular  open fracture dislocation. POSTOPERATIVE DIAGNOSIS:  Right thumb proximal phalanx intraarticular  open fracture dislocation. PROCEDURES DONE:  1. Open treatment of articular fracture of right thumb interphalangeal  joint. 2.  Debridement of open fracture including skin, subcutaneous tissue,  fascia and bone. ANESTHESIA:  Local MAC. ESTIMATED BLOOD LOSS:  Minimal.    COMPLICATIONS:  None. INDICATIONS:  This is an 27-year-old white female right hand dominant  who [facility] and there was a construction site,  where she tripped over something on the floor and landed on her both  upper extremities and her face. She was brought by EMS to [facility]  [facility], where she was found to have an open right thumb fracture dislocation  of her IP joint as well as the left proximal humerus fracture. I was  consulted for her injury. All risks, benefits, and alternatives were  discussed with the patient. She agreed to proceed with the surgical  treatment of the thumb open fracture dislocation. DESCRIPTION OF PROCEDURE:  The patient's right hand was marked. She  received 2 gm Ancef IV preoperatively. The patient was then brought to  the operating room, underwent local MAC. A digital block was applied  under sterile condition.   The right upper extremity was then prepped and  draped in regular sterile routine fashion. A timeout was called  confirming the patient's name, site, and procedure. We will start with debridement. We used 2 liters of normal saline mixed  with gentamicin for irrigation. There was an intraarticular fracture of  the distal phalanx including the IP joint with the fracture dislocation. We irrigated the joint copiously with normal saline up to the left thumb  IP joint. We also used a rongeur for debridement and knife for excision  and debridement of the skin, subcutaneous tissue, and fascia down to  bone. After we finished the debridement and thorough irrigation, we  reduced the intraarticular fracture with the fracture dislocation back  in place and while maintaining the reduction we used a #3-0 PDS to  repair the capsule to keep the intraarticular fracture anatomically in  place including the joint. At this point, the intraarticular fracture  and the joint are stable. We then repaired the skin with a 4-0 nylon. A dressing was applied with Xeroform, 4x4s, sterile Webril, and an Ace  wrap with the thumb and back to the position. The patient tolerated the procedure well and was taken to Recovery in  stable condition. POSTOPERATIVE PLAN:  The patient will be readmitted as an inpatient. She will continue IV antibiotics for 24 hours. She can potentially  leave tomorrow if she is stable medically. We will discuss about her  left proximal humerus fracture. We would like to repeat the x-ray in a  few days to see if there is any more displacement that will require an  open reduction internal fixation.         Tasneem Olmos MD    D: 03/07/2023 20:45:36       T: 03/08/2023 7:19:39     [id number]  Job#: [id number]     Doc#: [id number]    CC:  Radha Carcamo NP

## 2023-03-08 NOTE — PROGRESS NOTES
Physical Therapy  Facility/Department: [place] [address]  Physical Therapy Initial Assessment    Name: Shabana Calle  : 1935  MRN: [id number]  Date of Service: 3/8/2023    Discharge Recommendations: Shabana Calle scored a 17/24 on the AM-PAC short mobility form. Current research shows that an AM-PAC score of 17 or less is typically not associated with a discharge to the patient's home setting. Based on the patient's AM-PAC score and their current functional mobility deficits, it is recommended that the patient have 5-7 sessions per week of Physical Therapy at d/c to increase the patient's independence. At this time, this patient demonstrates complex nursing, medical, and rehabilitative needs, and would benefit from intensive rehabilitation services upon discharge from the Inpatient setting. This patient demonstrates the ability to participate in and benefit from an intensive therapy program with a coordinated interdisciplinary team approach to foster frequent, structured, and documented communication among disciplines, who will work together to establish, prioritize, and achieve treatment goals. Please see assessment section for further patient specific details. If patient discharges prior to next session this note will serve as a discharge summary. Please see below for the latest assessment towards goals. Patient Diagnosis(es): The primary encounter diagnosis was Other closed displaced fracture of proximal end of left humerus, initial encounter. Diagnoses of Open displaced fracture of proximal phalanx of right thumb, initial encounter and Fall from standing, initial encounter were also pertinent to this visit. Past Medical History:  has a past medical history of Arthritis, Fractures, Osteoarthritis, and Osteoporosis. Past Surgical History:  has a past surgical history that includes hip surgery and Hand surgery (Right, 3/7/2023).     Assessment   Assessment: Patient presents to ED post fall resulting in L humerus fracture and R thumb distal phalanx fracture. Patient is unable to bear weight through LUE and has limited mobility in RUE, preventing her from using an assistive device. She uses a single-point cane at baseline due to balance concerns. She says her  is available 24hr but may not be able to provide physical assist. Patient will require assist of 1 to safely ambulate and complete transfers. Will benefit from continued IP PT to increase LE strength in order to increase functional independence without use of AD. Plan to follow per POC to progress mobility as tolerated. Treatment Diagnosis: decreased functional mobility  Requires PT Follow-Up: Yes  Activity Tolerance  Activity Tolerance: Patient tolerated evaluation without incident     Plan   Physcial Therapy Plan  General Plan:  (2-5)  Current Treatment Recommendations: Strengthening, ROM, Balance training, Functional mobility training, Transfer training, Gait training, Stair training, Endurance training, Positioning, Equipment evaluation, education, & procurement, Patient/Caregiver education & training, Safety education & training, Therapeutic activities  Safety Devices  Type of Devices: Nurse notified, Call light within reach, Left in chair, Chair alarm in place     Restrictions  Position Activity Restriction  Other position/activity restrictions: up with assist, activity- day of surgery, NWB operative site; sling L UE     Subjective   General  Chart Reviewed: Yes  Patient assessed for rehabilitation services?: Yes  Additional Pertinent Hx: Patient presents to ED post fall on L shoulder and R hand. S/p DEBRIDEMENT OF OPEN FRACTURE, RIGHT THUMB AND  OPED REDUCTION OF RIGHT THUMB DISLOCATION 3/7. Head CT shows L anterior frontal scalp hematoma with no fx, mild atrophy and chronic small vessel ischemic changes. Chest XR shows B pleural effusion and acute fracture of L humeral neck.  Finger XR shows acute fx base of 1st digit distal phalanx. PMH: OA, osteoporosis, fractures. Diagnosis: open displaced fracture of proximal phalanx of right thumb, initial encounter  Subjective  Subjective: Patient supine at arrival and agreeable to evaluation. Reports she only has pain when she moves, but the thumb hurts more than the L arm currently.          Social/Functional History  Social/Functional History  Lives With: Spouse  Type of Home: [facility]  Home Layout: Two level, Able to Live on Main level with bedroom/bathroom, Laundry in basement  Home Access: Stairs to enter with rails  Entrance Stairs - Number of Steps: 1  Bathroom Shower/Tub: Walk-in shower  Bathroom Toilet: Handicap height  Bathroom Equipment: Grab bars in shower (\"might have bench\")  Home Equipment: Earle Harden, rolling  Has the patient had two or more falls in the past year or any fall with injury in the past year?: Yes  Receives Help From: Family  ADL Assistance: [address]: Needs assistance (she does cooking, has )  Ambulation Assistance: Independent (uses cane outside of house, does not need it in the house (uses furniture))  Transfer Assistance: Independent  Active : Yes  Mode of Transportation: Car  Additional Comments:  can help with laundry and household tasks  Vision/Hearing  Vision  Vision: Impaired  Vision Exceptions: Wears glasses at all times  Hearing  Hearing: Exceptions to [facility]  Hearing Exceptions: Bilateral hearing aid    Cognition   Orientation  Overall Orientation Status: Within Normal Limits  Orientation Level: Oriented X4     Objective   Gross Assessment  AROM: Generally decreased, functional  Strength: Generally decreased, functional   Bed mobility  Rolling to Right: Supervision  Supine to Sit: Supervision (increased time, cues to push through R elbow from sidelying)  Scooting: Stand by assistance  Transfers  Sit to Stand: Minimal Assistance (unable to push through [address])  Stand to Sit: Minimal Assistance (cues to maintain WB precaution, dec eccentric control)  Ambulation  Device: No Device  Other Apparatus:  (sling LUE)  Assistance: Contact guard assistance  Gait Deviations: Slow Cony; Shuffles;Decreased step length;Decreased step height  Distance: 10' x 2 + 30'  Comments: cues to maintain WB precautions, patient reaches out to stabilize with RUE; helps if therapist stablizes/takes weight of R elbow     Balance  Sitting - Static: Good  Standing - Static: Fair  Standing - Dynamic: Fair (Saeed at times)    AM-PAC Score  AM-PAC Inpatient Mobility Raw Score : 17 (03/08/23 1037)  AM-PAC Inpatient T-Scale Score : 42.13 (03/08/23 1037)  Mobility Inpatient CMS 0-100% Score: 50.57 (03/08/23 1037)  Mobility Inpatient CMS G-Code Modifier : CK (03/08/23 1037)    Goals  Short Term Goals  Time Frame for Short Term Goals: discharge  Short Term Goal 1: supine<>sit independent  Short Term Goal 2: sit<>stand SBA  Short Term Goal 3: ambulate 150ft no AD SBA  Short Term Goal 4: ascend/descend 2 stairs Saeed  Patient Goals   Patient Goals : not stated       Education  Patient Education  Education Given To: Patient  Education Provided: Role of Therapy  Education Method: Verbal  Barriers to Learning: None  Education Outcome: Verbalized understanding      Therapy Time   Individual Concurrent Group Co-treatment   Time In 0915         Time Out 1009         Minutes 54          Timed Code Treatment Minutes:   39    Total Treatment Minutes:   [address] [facility]

## 2023-03-08 NOTE — PROGRESS NOTES
Pt received from OR to PACU # 9 via stretcher. Post:  I & D Right thumb     Report received from OR RN and Sridevi Benjamin CRNA. Per report pt did well. Respirations reg and easy. Pt is alert. Attached to PACU monitoring system. Alarms and parameters set    Pain 0 and denies complaints of nausea.

## 2023-03-08 NOTE — PLAN OF CARE
Patient slept well overnight. PRN Norco managed pain to left shoulder well. NSR on the monitor. Up with 2 people to bathroom d/t unsteady & PTA fall. IVF infusing per MAR. VSS. On room air. Bed alarm in place for safety. Patient calls out appropriately using call light. Problem: Safety - Adult  Goal: Free from fall injury  3/8/2023 0544 by Aamir Worthy RN  Outcome: Progressing  3/8/2023 0544 by Aamir Worthy RN  Outcome: Progressing  Flowsheets (Taken 3/8/2023 0544)  Free From Fall Injury: Jesi Scherer family/caregiver on patient safety  3/8/2023 0239 by Aamir Worthy RN  Outcome: Progressing  Flowsheets (Taken 3/8/2023 0239)  Free From Fall Injury: Instruct family/caregiver on patient safety     Problem: Discharge Planning  Goal: Discharge to home or other facility with appropriate resources  3/8/2023 0544 by Aamir Worthy RN  Outcome: Progressing       Problem: Pain  Goal: Verbalizes/displays adequate comfort level or baseline comfort level  3/8/2023 0544 by Aamir Worthy RN  Outcome: Progressing    Flowsheets (Taken 3/8/2023 0544)  Verbalizes/displays adequate comfort level or baseline comfort level:   Encourage patient to monitor pain and request assistance   Assess pain using appropriate pain scale   Administer analgesics based on type and severity of pain and evaluate response   Implement non-pharmacological measures as appropriate and evaluate response       Problem: Skin/Tissue Integrity  Goal: Absence of new skin breakdown  Description: 1. Monitor for areas of redness and/or skin breakdown  2. Assess vascular access sites hourly  3. Every 4-6 hours minimum:  Change oxygen saturation probe site  4. Every 4-6 hours:  If on nasal continuous positive airway pressure, respiratory therapy assess nares and determine need for appliance change or resting period.   3/8/2023 0544 by Aamir Worthy RN  Outcome: Progressing  3/8/2023 0544 by Aamir Worthy RN       Problem: ABCDS Injury Assessment  Goal: Absence of physical injury  3/8/2023 0544 by Augusto Siddiqui RN  Outcome: Progressing  3/8/2023 0544 by Augusto Siddiqui RN  Flowsheets (Taken 3/8/2023 1185)  Absence of Physical Injury: Implement safety measures based on patient assessment

## 2023-03-08 NOTE — BRIEF OP NOTE
Brief Postoperative Note      Patient: Aleshia Arriaga  YOB: 1935  MRN: [id number]    Date of Procedure: 3/7/2023    Pre-Op Diagnosis: OPEN FRACTURE RIGHT THUMB    Post-Op Diagnosis: Same       Procedure(s):  DEBRIDEMENT INCISION AND DRAINAGE OF RIGHT THUMB    Surgeon(s):  Oliverio Durant MD    Assistant:  Surgical Assistant: Ricky Stover    Anesthesia: General    Estimated Blood Loss (mL): Minimal    Complications: None    Specimens:   * No specimens in log *    Implants:  * No implants in log *      Drains:   External Urinary Catheter (Active)       Findings: Same.     Electronically signed by Oliverio Durant MD on 3/7/2023 at 7:03 PM

## 2023-03-08 NOTE — PROGRESS NOTES
Hospitalist Progress Note      PCP: Felicia Romero, APRN - CNP    Date of Admission: 3/7/2023    Hospital Course:   80 y.o. female Hx CHF,HTN, Anxiety,Emphysema who presented with after fall. She slipped on her way to [place]. She was walking on ramp with construction. She landed on left side. Pt had pain after fall. She had no LOC. She sustained head trauma to left side of face. Pt is otherwise coherent. In ER,vitals unremarkable. Xray right finger showed acute fracture of the base of the first digit distal phalanx. Xray left shoulder showed acute minimally displaced fracture of the left humeral neck. CXR showed Small bilateral pleural effusions and acute left humeral neck fracture. CT head/maxillofacial showed Small midline/left anterior frontal scalp hematoma,Mild-to-moderate left infraorbital/upper cheek swelling. ER provider contacted Orthopedic Surgery. Plan for OR today. Subjective:    Pt feels better. She needs assistance with eating. Pain is controlled.       Medications:  Reviewed    Infusion Medications    sodium chloride      sodium chloride 75 mL/hr at 03/07/23 2151    sodium chloride      sodium chloride       Scheduled Medications    atorvastatin  40 mg Oral Nightly    carvedilol  3.125 mg Oral BID WC    NIFEdipine  60 mg Oral BID    spironolactone  25 mg Oral Daily    valsartan  320 mg Oral Daily    sodium chloride flush  5-40 mL IntraVENous 2 times per day    PARoxetine  20 mg Oral Daily    sodium chloride flush  5-40 mL IntraVENous 2 times per day    ceFAZolin (ANCEF) IVPB  2,000 mg IntraVENous Q8H    budesonide  0.5 mg Nebulization BID    And    arformoterol tartrate  15 mcg Nebulization BID    And    tiotropium  2 puff Inhalation Daily     PRN Meds: famotidine, sodium chloride flush, sodium chloride, ondansetron **OR** ondansetron, polyethylene glycol, acetaminophen **OR** acetaminophen, potassium chloride **OR** potassium alternative oral replacement **OR** potassium chloride, magnesium sulfate, senna, HYDROcodone 5 mg - acetaminophen, sodium chloride flush, sodium chloride, morphine      Intake/Output Summary (Last 24 hours) at 3/8/2023 0854  Last data filed at 3/8/2023 0353  Gross per 24 hour   Intake 1079.6 ml   Output 5 ml   Net 1074.6 ml       Physical Exam Performed:    BP (!) 169/80   Pulse 79   Temp 98.7 °F (37.1 °C) (Oral)   Resp 16   Ht 5' 5\" (1.651 m)   Wt 176 lb 1.6 oz (79.9 kg)   SpO2 95%   BMI 29.30 kg/m²   General appearance:  No apparent distress, appears stated age and cooperative. HEENT:  MMM,left orbit echymosis decreased,left lip swelling  Neck: Supple, with full range of motion. No jugular venous distention. Trachea midline. Respiratory:  Normal respiratory effort. Clear to auscultation, bilaterally   Cardiovascular:  Regular rate and rhythm with normal S1/S2 without murmurs  Abdomen: Soft, non-tender, non-distended with normal bowel sounds. Musculoskeletal:  No edema bilaterally,left arm in sling, right thumb in dressing  Skin: Skin color, texture, turgor normal.  No rashes or lesions. Neurologic:  grossly non-focal.  Psychiatric:  Alert and oriented, thought content appropriate, normal insight         Labs:   Recent Labs     03/07/23  1512 03/08/23  0734   WBC 7.8 6.8   HGB 14.0 12.9   HCT 41.9 38.9    182     Recent Labs     03/07/23  1512 03/08/23  0734   * 133*   K 4.4 4.2   CL 93* 99   CO2 26 25   BUN 31* 17   CREATININE 0.7 0.6   CALCIUM 9.9 8.7     No results for input(s): AST, ALT, BILIDIR, BILITOT, ALKPHOS in the last 72 hours. Recent Labs     03/07/23  1512   INR 0.97     No results for input(s): [place] in the last 72 hours. Urinalysis:    No results found for: [place] Tom, BACTERIA, RBCUA, BLOODU, SPECGRAV, [place] [address]    Radiology:  XR SHOULDER LEFT (MIN 2 VIEWS)   Final Result   1. Acute minimally displaced fracture of the left humeral neck. XR FINGER RIGHT (MIN 2 VIEWS)   Final Result   1.   Acute fracture of the base of the first digit distal phalanx. XR CHEST PORTABLE   Final Result   1. Small bilateral pleural effusions. 2.  Acute fracture of the left humeral neck      CT MAXILLOFACIAL WO CONTRAST   Final Result   HEAD:   Small midline/left anterior frontal scalp hematoma with no underlying calvarial fracture, intrarenal hemorrhage, or mass effect. Mild atrophy and chronic small vessel ischemic change. FACE:   Mild-to-moderate left infraorbital/upper cheek swelling, with no underlying acute facial fracture. CT Head W/O Contrast   Final Result   HEAD:   Small midline/left anterior frontal scalp hematoma with no underlying calvarial fracture, intrarenal hemorrhage, or mass effect. Mild atrophy and chronic small vessel ischemic change. FACE:   Mild-to-moderate left infraorbital/upper cheek swelling, with no underlying acute facial fracture. Assessment/Plan:    Active Hospital Problems    Diagnosis     Fx humeral neck, left, closed, initial encounter [S42.212A]      Priority: Medium    Closed fracture of left proximal humerus [S42.202A]      Priority: Medium    Open displaced fracture of proximal phalanx of right thumb [S62.511B]      Priority: Medium     Left Humeral neck fracture  -Orthopedic Surgery consulted. Recommend ORIF the next week as outpatient.  -Pain control     Displaced first digit distal phalanx fracture. -Orthopedic Surgery consulted. s/p internal fixation,debridement, I&D of right thumb on 3/8/23.  -Pain control      Left infraorbital/upper cheek swelling  -Supportive care     Small midline/left anterior frontal scalp hematoma  -Supportive care. Avoiding heparin products     Mechanical fall  -PT/OT  -Fall precautions     Hypertension  -BP controlled     Chronic diastolic CHF  Hx Takotsubo cardimyopathy with LV thrombus in 2017  -Pt is compensated. Pt is on ARB and coreg.  -Telemetry.      Hx PVCs  -Per outpatient Cardiology note, pt wore Holter and event recorder no Afib seen.     Emphysema  -Pt is on trelegy and follows with Pulmonology outpatient. DVT Prophylaxis: SCDs due to hematoma  Diet: ADULT DIET; Regular  Code Status: Full Code    PT/OT Eval Status: ordered    Dispo - Home tomorrow once plan for resources for patient. She wants to go home but not able to go home with assistance with ADLs.     Yanira Amin MD

## 2023-03-08 NOTE — PROGRESS NOTES
4 Eyes Admission Assessment     I agree as the admission nurse that 2 RN's have performed a thorough Head to Toe Skin Assessment on the patient. ALL assessment sites listed below have been assessed on admission. Areas assessed by both nurses:   [x]   Head, Face, and Ears   []   Shoulders, Back, and Chest  [x]   Arms, Elbows, and Hands   [x]   Coccyx, Sacrum, and Ischium  [x]   Legs, Feet, and Heels        Does the Patient have Skin Breakdown?   Yes a wound was noted on the Admission Assessment and an LDA was Initiated documentation include the Hetal-wound, Wound Assessment, Measurements, Dressing Treatment, Drainage, and Color\",         Dom Prevention initiated:  Yes   Wound Care Orders initiated:  Yes      [id number]  nurse consulted for Pressure Injury (Stage 3,4, Unstageable, DTI, NWPT, and Complex wounds) or Dom score 18 or lower:  No      Nurse 1 eSignature: Electronically signed by Pablo Shepherd RN on 3/8/23 at 1:03 AM EST    **SHARE this note so that the co-signing nurse is able to place an eSignature**    Nurse 2 eSignature: {Esignature:[id number]}

## 2023-03-08 NOTE — PROGRESS NOTES
Patient admitted to room 5503 from PACU s/p right thumb I&D. Patient is A&O x4, on room air. All VSS. Pain tolerable at this time per patient. Purewick in place. NSR on tele. Bed alarm on for safety. Oriented patient to room, environment and call light. Instructed to call out with any needs or concerns. Patient verbalized an understanding.

## 2023-03-08 NOTE — PROGRESS NOTES
Occupational Therapy  Facility/Department: [facility] 5T ORTHO/NEURO  Occupational Therapy Initial Assessment/Treatment    Name: Corie Schulz  : 1935  MRN: [id number]  Date of Service: 3/8/2023    Discharge Recommendations:  Corie Schulz scored a 11 on the AM-PAC ADL Inpatient form. Current research shows that an AM-PAC score of 17 or less is typically not associated with a discharge to the patient's home setting. Based on the patient's AM-PAC score and their current ADL deficits, it is recommended that the patient have 5-7 sessions per week of Occupational Therapy at d/c to increase the patient's independence. At this time, this patient demonstrates complex nursing, medical, and rehabilitative needs, and would benefit from intensive rehabilitation services upon discharge from the Inpatient setting. This patient demonstrates the ability to participate in and benefit from an intensive therapy program with a coordinated interdisciplinary team approach to foster frequent, structured, and documented communication among disciplines, who will work together to establish, prioritize, and achieve treatment goals. Please see assessment section for further patient specific details. If patient discharges prior to next session this note will serve as a discharge summary. Please see below for the latest assessment towards goals. OT Equipment Recommendations  Other: should pt d/c home, recommend shower chair       Patient Diagnosis(es): The primary encounter diagnosis was Other closed displaced fracture of proximal end of left humerus, initial encounter. Diagnoses of Open displaced fracture of proximal phalanx of right thumb, initial encounter and Fall from standing, initial encounter were also pertinent to this visit. Past Medical History:  has a past medical history of Arthritis, Fractures, Osteoarthritis, and Osteoporosis.   Past Surgical History:  has a past surgical history that includes hip surgery and Hand surgery (Right, 3/7/2023). Treatment Diagnosis: Impaired ADLs, mobility and UE function      Assessment   Performance deficits / Impairments: Decreased functional mobility ; Decreased ADL status; Decreased balance;Decreased strength;Decreased ROM; Decreased high-level IADLs  Assessment: Pt from home with  and is independent and active at baseline, has some balance issues and occasionall furniture walks in the home, uses [facility] community distances. Pt now presents significantly below her baseline due to WB and ROM restrictions of B UE due to L shoulder fracture and immobilization in sling and R thumb fracture. Pt requires max-total assist for most ADLs, min A for transfers and mobility. Pt demo impaired standing balance and decreased bilat LE strength limiting transfer independence. Pt is a high fall risk, is unable to use AD for mobility due to UE WB restrictions.  is unable to physically assist pt with mobility at home. Recommend IP OT at d/c to maximize pt safety and independence  Treatment Diagnosis: Impaired ADLs, mobility and UE function  Prognosis: Good  Decision Making: Medium Complexity  REQUIRES OT FOLLOW-UP: Yes  Activity Tolerance  Activity Tolerance: Patient Tolerated treatment well        Plan   Occupational Therapy Plan  Times Per Week: 2-5  Current Treatment Recommendations: Functional mobility training, Self-Care / ADL, Safety education & training, Balance training, Patient/Caregiver education & training     Restrictions  Position Activity Restriction  Other position/activity restrictions: up with assist, activity- day of surgery, NWB operative site; sling L UE    Subjective   General  Chart Reviewed: Yes  Patient assessed for rehabilitation services?: Yes  Additional Pertinent Hx: Pt admitted s/p fall 3/7. sustained  Right hand thumb distal phalanx base open fracture and Left shoulder pain/ moderately displaced proximal humerus fracture.  3/7 s/p DEBRIDEMENT OF OPEN FRACTURE, RIGHT THUMB AND  OPED REDUCTION OF RIGHT THUMB DISLOCATION . Family / Caregiver Present: No  Diagnosis: L shoulder fracture, R thumb fracture  Subjective  Subjective: Pt in bed upon OT entry. Agreeable to therapy \" I don't know what I'll do. It takes 2 nurses  to get me to the bathroom\"  Pain: L shoulder, not rated     Social/Functional History  Social/Functional History  Lives With: Spouse  Type of Home: [facility]  Home Layout: Two level, Able to Live on Main level with bedroom/bathroom, Laundry in basement  Home Access: Stairs to enter with rails  Entrance Stairs - Number of Steps: 1  Bathroom Shower/Tub: Walk-in shower  Bathroom Toilet: Handicap height  Bathroom Equipment: Grab bars in shower (\"might have bench\")  Home Equipment: [employer], rolling  Has the patient had two or more falls in the past year or any fall with injury in the past year?: Yes  Receives Help From: Family  ADL Assistance: [facility]: Needs assistance (she does cooking, has )  Ambulation Assistance: Independent (uses cane outside of house, does not need it in the house (uses furniture))  Transfer Assistance: Independent  Active : Yes  Mode of Transportation: Car  Additional Comments:  can help with laundry and household tasks       Objective   Observation: L UE in sling; R hand bandaged  and entire thumb bandaged       Toilet Transfers  Toilet - Technique: Ambulating (with CGA)  Equipment Used: Standard toilet (VC to avoid pulling on R grab bar)  Toilet Transfer: Minimal assistance  Toilet Transfers Comments: Pt has BSC over toilet at home- placed BSC over toilet end of session      AROM:  (R shoulder and elbow WFL;  R hand limited by bandaging, lacking ROM  to make full fist or oppose 2nd digit to thumb; L UE in shoulder sling, L hand and wrist AROM WFL)  Strength:  Within functional limits  Sensation: Intact  Comment: educated pt to complete AROM L wrist and hand while in sling to maintain joint ROM    ADL  Feeding: pt reports difficulty eating breakfast earlier  Grooming:  (pt states PCA brushed her teeth earlier- pt can't hold toothbrush)  LE Dressing: Maximum assistance (don underwear)  LE Dressing Skilled Clinical Factors: pt initiated with R  hand but unable to pull up 2/2 bandaging and restrictions R hand  Toileting: Maximum assistance (pt completed hygiene; assist clothing mgmt)  Additional Comments: all ADLs limited by immobilization of R hand in bandaging and immobilization L UE in sling. Priovided pt foam to build up handle of utensils and toothbrush, encouraged pt try eating finger foods       Activity Tolerance  Activity Tolerance: Patient tolerated evaluation without incident    Bed mobility  Rolling to Right: Supervision  Supine to Sit: Supervision (increased time, cues to prevent pushing through R hand)  Scooting: Stand by assistance (VC to prevent WB through R UE)      Transfers  Sit to stand: Minimal assistance (bed x2; VC to rock to improve anterior weight shift)  Stand to sit: Minimal assistance (bed, chair; decreased eccentric control)    Functional Mobility  Equipment: no AD  Level of Assist: contact guard assist  Distance: to/from bathroom, to/from hallway door  Comment: pt unsteady, slow paced    Vision  Vision: Impaired  Vision Exceptions: Wears glasses at all times  Hearing  Hearing: Exceptions to [facility]  Hearing Exceptions: Bilateral hearing aid    Cognition  Overall Cognitive Status: WFL  Orientation  Overall Orientation Status: Within Normal Limits  Orientation Level: Oriented X4       Safety Devices  Type of Devices: Nurse notified;Call light within reach; Left in chair;Chair alarm in place    AM-PAC Score    AM-[facility] Inpatient Daily Activity Raw Score: 11 (03/08/23 1057)  AM-PAC Inpatient ADL T-Scale Score : 29.04 (03/08/23 1057)  ADL Inpatient CMS 0-100% Score: 70.42 (03/08/23 1057)  ADL Inpatient CMS G-Code Modifier : CL (03/08/23 1057)    Goals  Short Term Goals  Time Frame for Short Term Goals: discharge  Short Term Goal 1: sit<>stand transfers with supervision  Short Term Goal 2: supervision standing balance during ADLs  Short Term Goal 3: min A LB dressing  Short Term Goal 4: mod A UB dressing       Therapy Time   Individual Concurrent Group Co-treatment   Time In 0914         Time Out 1010         Minutes 56         Timed Code Treatment Minutes: Patric Yu OT

## 2023-03-08 NOTE — PROGRESS NOTES
PACU Transfer to [facility] 3:    03/07/23 1945   BP: (!) 150/81   Pulse: 91   Resp: 24   Temp: 97.3 °F (36.3 °C)   SpO2: 95%         Intake/Output Summary (Last 24 hours) at 3/7/2023 1957  Last data filed at 3/7/2023 1953  Gross per 24 hour   Intake 230 ml   Output 5 ml   Net 225 ml       Pain assessment:  present - adequately treated  Pain Level: 4    Patient transferred to care of Brunilda EGAN RN.

## 2023-03-09 ENCOUNTER — APPOINTMENT (OUTPATIENT)
Dept: GENERAL RADIOLOGY | Age: 88
DRG: 513 | End: 2023-03-09
Payer: MEDICARE

## 2023-03-09 LAB
ANION GAP SERPL CALCULATED.3IONS-SCNC: 11 MMOL/L (ref 3–16)
BUN BLDV-MCNC: 13 MG/DL (ref 7–20)
CALCIUM SERPL-MCNC: 8.9 MG/DL (ref 8.3–10.6)
CHLORIDE BLD-SCNC: 96 MMOL/L (ref 99–110)
CO2: 24 MMOL/L (ref 21–32)
CREAT SERPL-MCNC: <0.5 MG/DL (ref 0.6–1.2)
GFR SERPL CREATININE-BSD FRML MDRD: >60 ML/MIN/{1.73_M2}
GLUCOSE BLD-MCNC: 165 MG/DL (ref 70–99)
HCT VFR BLD CALC: 37 % (ref 36–48)
HEMOGLOBIN: 12.5 G/DL (ref 12–16)
MCH RBC QN AUTO: 33.5 PG (ref 26–34)
MCHC RBC AUTO-ENTMCNC: 33.6 G/DL (ref 31–36)
MCV RBC AUTO: 99.6 FL (ref 80–100)
PDW BLD-RTO: 12.9 % (ref 12.4–15.4)
PLATELET # BLD: 175 K/UL (ref 135–450)
PMV BLD AUTO: 9.5 FL (ref 5–10.5)
POTASSIUM REFLEX MAGNESIUM: 3.9 MMOL/L (ref 3.5–5.1)
RBC # BLD: 3.72 M/UL (ref 4–5.2)
SODIUM BLD-SCNC: 131 MMOL/L (ref 136–145)
WBC # BLD: 10.5 K/UL (ref 4–11)

## 2023-03-09 PROCEDURE — 2580000003 HC RX 258: Performed by: ORTHOPAEDIC SURGERY

## 2023-03-09 PROCEDURE — 80048 BASIC METABOLIC PNL TOTAL CA: CPT

## 2023-03-09 PROCEDURE — 1200000000 HC SEMI PRIVATE

## 2023-03-09 PROCEDURE — 97116 GAIT TRAINING THERAPY: CPT

## 2023-03-09 PROCEDURE — 6370000000 HC RX 637 (ALT 250 FOR IP): Performed by: PHYSICIAN ASSISTANT

## 2023-03-09 PROCEDURE — 97530 THERAPEUTIC ACTIVITIES: CPT

## 2023-03-09 PROCEDURE — 6370000000 HC RX 637 (ALT 250 FOR IP): Performed by: ORTHOPAEDIC SURGERY

## 2023-03-09 PROCEDURE — APPNB60 APP NON BILLABLE TIME 46-60 MINS: Performed by: PHYSICIAN ASSISTANT

## 2023-03-09 PROCEDURE — 94640 AIRWAY INHALATION TREATMENT: CPT

## 2023-03-09 PROCEDURE — 6360000002 HC RX W HCPCS: Performed by: INTERNAL MEDICINE

## 2023-03-09 PROCEDURE — 99024 POSTOP FOLLOW-UP VISIT: CPT | Performed by: PHYSICIAN ASSISTANT

## 2023-03-09 PROCEDURE — 85027 COMPLETE CBC AUTOMATED: CPT

## 2023-03-09 PROCEDURE — 36415 COLL VENOUS BLD VENIPUNCTURE: CPT

## 2023-03-09 PROCEDURE — 73130 X-RAY EXAM OF HAND: CPT

## 2023-03-09 PROCEDURE — 96372 THER/PROPH/DIAG INJ SC/IM: CPT

## 2023-03-09 RX ORDER — OXYCODONE HYDROCHLORIDE AND ACETAMINOPHEN 5; 325 MG/1; MG/1
1 TABLET ORAL EVERY 4 HOURS PRN
Status: DISCONTINUED | OUTPATIENT
Start: 2023-03-09 | End: 2023-03-13 | Stop reason: HOSPADM

## 2023-03-09 RX ORDER — ENOXAPARIN SODIUM 100 MG/ML
30 INJECTION SUBCUTANEOUS DAILY
Status: DISCONTINUED | OUTPATIENT
Start: 2023-03-09 | End: 2023-03-10

## 2023-03-09 RX ADMIN — ENOXAPARIN SODIUM 30 MG: 100 INJECTION SUBCUTANEOUS at 17:15

## 2023-03-09 RX ADMIN — SODIUM CHLORIDE, PRESERVATIVE FREE 10 ML: 5 INJECTION INTRAVENOUS at 08:23

## 2023-03-09 RX ADMIN — BUDESONIDE 500 MCG: 0.5 INHALANT RESPIRATORY (INHALATION) at 20:29

## 2023-03-09 RX ADMIN — CARVEDILOL 3.12 MG: 6.25 TABLET, FILM COATED ORAL at 08:22

## 2023-03-09 RX ADMIN — NIFEDIPINE 60 MG: 60 TABLET, FILM COATED, EXTENDED RELEASE ORAL at 08:23

## 2023-03-09 RX ADMIN — ARFORMOTEROL TARTRATE 15 MCG: 15 SOLUTION RESPIRATORY (INHALATION) at 20:29

## 2023-03-09 RX ADMIN — SODIUM CHLORIDE, PRESERVATIVE FREE 10 ML: 5 INJECTION INTRAVENOUS at 21:18

## 2023-03-09 RX ADMIN — TIOTROPIUM BROMIDE INHALATION SPRAY 2 PUFF: 3.12 SPRAY, METERED RESPIRATORY (INHALATION) at 08:11

## 2023-03-09 RX ADMIN — BUDESONIDE 500 MCG: 0.5 INHALANT RESPIRATORY (INHALATION) at 08:09

## 2023-03-09 RX ADMIN — OXYCODONE AND ACETAMINOPHEN 1 TABLET: 5; 325 TABLET ORAL at 17:15

## 2023-03-09 RX ADMIN — HYDROCODONE BITARTRATE AND ACETAMINOPHEN 1 TABLET: 5; 325 TABLET ORAL at 01:57

## 2023-03-09 RX ADMIN — OXYCODONE AND ACETAMINOPHEN 1 TABLET: 5; 325 TABLET ORAL at 11:00

## 2023-03-09 RX ADMIN — CARVEDILOL 3.12 MG: 6.25 TABLET, FILM COATED ORAL at 17:15

## 2023-03-09 RX ADMIN — PAROXETINE HYDROCHLORIDE 20 MG: 20 TABLET, FILM COATED ORAL at 08:23

## 2023-03-09 RX ADMIN — VALSARTAN 320 MG: 320 TABLET, FILM COATED ORAL at 21:17

## 2023-03-09 RX ADMIN — POLYETHYLENE GLYCOL 3350 17 G: 17 POWDER, FOR SOLUTION ORAL at 06:26

## 2023-03-09 RX ADMIN — ATORVASTATIN CALCIUM 40 MG: 40 TABLET, FILM COATED ORAL at 21:17

## 2023-03-09 RX ADMIN — SPIRONOLACTONE 25 MG: 25 TABLET, FILM COATED ORAL at 08:23

## 2023-03-09 RX ADMIN — NIFEDIPINE 60 MG: 60 TABLET, FILM COATED, EXTENDED RELEASE ORAL at 21:17

## 2023-03-09 RX ADMIN — ARFORMOTEROL TARTRATE 15 MCG: 15 SOLUTION RESPIRATORY (INHALATION) at 08:09

## 2023-03-09 RX ADMIN — OXYCODONE AND ACETAMINOPHEN 1 TABLET: 5; 325 TABLET ORAL at 21:22

## 2023-03-09 RX ADMIN — SODIUM CHLORIDE, PRESERVATIVE FREE 10 ML: 5 INJECTION INTRAVENOUS at 21:17

## 2023-03-09 RX ADMIN — HYDROCODONE BITARTRATE AND ACETAMINOPHEN 1 TABLET: 5; 325 TABLET ORAL at 06:12

## 2023-03-09 ASSESSMENT — PAIN SCALES - GENERAL
PAINLEVEL_OUTOF10: 5
PAINLEVEL_OUTOF10: 5
PAINLEVEL_OUTOF10: 0
PAINLEVEL_OUTOF10: 0
PAINLEVEL_OUTOF10: 5
PAINLEVEL_OUTOF10: 7
PAINLEVEL_OUTOF10: 6
PAINLEVEL_OUTOF10: 8
PAINLEVEL_OUTOF10: 7

## 2023-03-09 ASSESSMENT — PAIN DESCRIPTION - LOCATION
LOCATION: SHOULDER
LOCATION: SHOULDER;FINGER (COMMENT WHICH ONE)
LOCATION: ARM
LOCATION: SHOULDER
LOCATION_2: FINGER (COMMENT WHICH ONE)

## 2023-03-09 ASSESSMENT — PAIN DESCRIPTION - FREQUENCY
FREQUENCY: CONTINUOUS

## 2023-03-09 ASSESSMENT — PAIN DESCRIPTION - ORIENTATION
ORIENTATION: LEFT
ORIENTATION_2: RIGHT
ORIENTATION: LEFT
ORIENTATION: LEFT
ORIENTATION: LEFT;RIGHT
ORIENTATION: LEFT
ORIENTATION: LEFT

## 2023-03-09 ASSESSMENT — PAIN DESCRIPTION - DESCRIPTORS
DESCRIPTORS: ACHING;THROBBING
DESCRIPTORS: ACHING;THROBBING
DESCRIPTORS_2: ACHING;DISCOMFORT
DESCRIPTORS: ACHING;DISCOMFORT
DESCRIPTORS: ACHING;THROBBING

## 2023-03-09 ASSESSMENT — PAIN DESCRIPTION - PAIN TYPE
TYPE: ACUTE PAIN

## 2023-03-09 ASSESSMENT — PAIN - FUNCTIONAL ASSESSMENT
PAIN_FUNCTIONAL_ASSESSMENT: ACTIVITIES ARE NOT PREVENTED
PAIN_FUNCTIONAL_ASSESSMENT: PREVENTS OR INTERFERES WITH ALL ACTIVE AND SOME PASSIVE ACTIVITIES
PAIN_FUNCTIONAL_ASSESSMENT: PREVENTS OR INTERFERES WITH MANY ACTIVE NOT PASSIVE ACTIVITIES
PAIN_FUNCTIONAL_ASSESSMENT_SITE2: PREVENTS OR INTERFERES WITH MANY ACTIVE NOT PASSIVE ACTIVITIES
PAIN_FUNCTIONAL_ASSESSMENT: ACTIVITIES ARE NOT PREVENTED
PAIN_FUNCTIONAL_ASSESSMENT: PREVENTS OR INTERFERES WITH MANY ACTIVE NOT PASSIVE ACTIVITIES

## 2023-03-09 ASSESSMENT — PAIN DESCRIPTION - ONSET
ONSET: ON-GOING

## 2023-03-09 ASSESSMENT — PAIN DESCRIPTION - INTENSITY: RATING_2: 5

## 2023-03-09 NOTE — PROGRESS NOTES
ain managed with PRN [place] overnight. C/o aching/ throbbing pain to right thumb and left shoulder. A&O x3- waxes/ wanes   Up out of bed with gait belt and x1 assistance. Bed alarm on for safety. NSR on the monitor.

## 2023-03-09 NOTE — PROGRESS NOTES
Physical Therapy  Facility/Department: [place] [address]  Physical Therapy Daily Treatment    Name: Casandra Santiago  : 1935  MRN: [id number]  Date of Service: 3/9/2023    Discharge Recommendations: Casandra Santiago scored a 17/24 on the AM-PAC short mobility form. Current research shows that an AM-PAC score of 17 or less is typically not associated with a discharge to the patient's home setting. Based on the patient's AM-PAC score and their current functional mobility deficits, it is recommended that the patient have 3-5 sessions per week of Physical Therapy at d/c to increase the patient's independence. Please see assessment section for further patient specific details. If patient discharges prior to next session this note will serve as a discharge summary. Please see below for the latest assessment towards goals. Patient Diagnosis(es): The primary encounter diagnosis was Other closed displaced fracture of proximal end of left humerus, initial encounter. Diagnoses of Open displaced fracture of proximal phalanx of right thumb, initial encounter and Fall from standing, initial encounter were also pertinent to this visit. Past Medical History:  has a past medical history of Arthritis, Fractures, Osteoarthritis, and Osteoporosis. Past Surgical History:  has a past surgical history that includes hip surgery and Hand surgery (Right, 3/7/2023). Assessment   Assessment: Patient continues to demo mobility deficits d/t UE limitations and precautions. Patient unable to put weight through B hand to complete transfers. Requires cueing to maintain precautions, Saeed for RUE support during gait, and assist of 1 for bed mobility. She is a fall risk with increased balance impairment as she is not able to use an assistive device d/t precautions. Patient does not have 24hr physical assist at home and would benefit from skilled therapy to ensure safe return to her PLOF.  Will continue to follow per POC.  Treatment Diagnosis: decreased functional mobility  Requires PT Follow-Up: Yes  Activity Tolerance  Activity Tolerance: Patient tolerated treatment well     Plan   Physcial Therapy Plan  General Plan:  (2-5)  Current Treatment Recommendations: Strengthening, ROM, Balance training, Functional mobility training, Transfer training, Gait training, Stair training, Endurance training, Positioning, Equipment evaluation, education, & procurement, Patient/Caregiver education & training, Safety education & training, Therapeutic activities  Safety Devices  Type of Devices: Nurse notified, Call light within reach, Left in chair, Chair alarm in place     Restrictions  Position Activity Restriction  Other position/activity restrictions: up with assist, activity- day of surgery, NWB operative site; sling L UE     Subjective   General  Chart Reviewed: Yes  Patient assessed for rehabilitation services?: Yes  Additional Pertinent Hx: Patient presents to ED post fall on L shoulder and R hand. S/p DEBRIDEMENT OF OPEN FRACTURE, RIGHT THUMB AND  OPED REDUCTION OF RIGHT THUMB DISLOCATION 3/7. Head CT shows L anterior frontal scalp hematoma with no fx, mild atrophy and chronic small vessel ischemic changes. Chest XR shows B pleural effusion and acute fracture of L humeral neck. Finger XR shows acute fx base of 1st digit distal phalanx. PMH: OA, osteoporosis, fractures. Diagnosis: open displaced fracture of proximal phalanx of right thumb, initial encounter  Subjective  Subjective: Patient supine and agreeable to therapy. She says she just has pain when she moves.          Social/Functional History  Social/Functional History  Lives With: Spouse  Type of Home: Condo  Home Layout: Two level, Able to Live on Main level with bedroom/bathroom, Laundry in basement  Home Access: Stairs to enter with rails  Entrance Stairs - Number of Steps: 3  Bathroom Shower/Tub: Walk-in shower  Bathroom Toilet: Handicap height  Bathroom Equipment: Grab bars in shower (\"might have bench\")  Home Equipment: [place], rolling  Has the patient had two or more falls in the past year or any fall with injury in the past year?: Yes  Receives Help From: Family  ADL Assistance: [address]: Needs assistance (she does cooking, has )  Ambulation Assistance: Independent (uses cane outside of house, does not need it in the house (uses furniture))  Transfer Assistance: Independent  Active : Yes  Mode of Transportation: Car  Additional Comments:  can help with laundry and household tasks  Vision/Hearing  Vision  Vision: Impaired  Vision Exceptions: Wears glasses at all times  Hearing  Hearing: Exceptions to [facility]  Hearing Exceptions: Bilateral hearing aid    Cognition   Orientation  Overall Orientation Status: Within Normal Limits  Orientation Level: Oriented X4     Objective   Gross Assessment  AROM: Generally decreased, functional  Strength: Generally decreased, functional   Bed mobility  Scooting: Minimal assistance;Stand by assistance (SBA in chair, Saeed to EOB)  Transfers  Sit to Stand: Minimal Assistance (modified push through R forearm)  Stand to Sit: Minimal Assistance  Ambulation  Device: No Device  Other Apparatus:  (replaced M sling with XL sling for increased support)  Assistance: Contact guard assistance;Minimal assistance  Gait Deviations: Slow Cony; Shuffles;Decreased step length;Decreased step height  Distance: 50' x 2  Comments: cues to maintain WB precautions; pt utilizes modified HHA stablizing R elbow/forearm through therapist hand     Balance  Sitting - Static: Good  Standing - Static: Good  Standing - Dynamic: Fair (Saeed to stabilize at R elbow)    AM-PAC Score  AM-PAC Inpatient Mobility Raw Score : 17 (03/09/23 1446)  AM-PAC Inpatient T-Scale Score : 42.13 (03/09/23 1446)  Mobility Inpatient CMS 0-100% Score: 50.57 (03/09/23 1446)  Mobility Inpatient CMS G-Code Modifier : CK (03/09/23 1446)    Goals  Short Term Goals  Time Frame for Short Term Goals: discharge, all ongoing 3/9  Short Term Goal 1: supine<>sit independent  Short Term Goal 2: sit<>stand SBA  Short Term Goal 3: ambulate 150ft no AD SBA  Short Term Goal 4: ascend/descend 2 stairs Saeed  Patient Goals   Patient Goals : not stated     Education  Patient Education  Education Given To: Patient  Education Provided: Role of Therapy  Education Method: Verbal  Barriers to Learning: None  Education Outcome: Verbalized understanding      Therapy Time   Individual Concurrent Group Co-treatment   Time In 1402         Time Out 1441         Minutes 39          Timed Code Treatment Minutes:   39    Total Treatment Minutes:  [address] [facility]

## 2023-03-09 NOTE — PROGRESS NOTES
Pt resting in chair, pain controlled after pain medication changed to percocet. Pt tolerating well. LUE sling changed to larger size, arm in now more stable. Encouraging pt to continue to use ice therapy. Pt is CGA with no device. Vitals WNL. Denied any further needs at time, all safety measures in place.

## 2023-03-09 NOTE — PROGRESS NOTES
Department of Orthopedic Surgery  Physician Assistant   Progress Note    Subjective:     Post op Day 2 ORIF/I&D right thumb and conservative management of left proximal humerus fracture. Systemic or Specific Complaints:Pain Control notes moderate pain around the left shoulder and in to the left thumb worried that she may have broken that thumb as well. Objective:     Patient Vitals for the past 24 hrs:   BP Temp Temp src Pulse Resp SpO2   03/09/23 0809 -- -- -- 87 14 97 %   03/09/23 0630 (!) 160/84 97.7 °F (36.5 °C) Axillary 91 16 94 %   03/09/23 0301 (!) 156/77 97.7 °F (36.5 °C) Oral 90 14 92 %   03/08/23 2215 (!) 167/81 97.7 °F (36.5 °C) Oral 88 18 93 %   03/08/23 2100 (!) 161/83 -- -- -- -- --   03/08/23 1915 (!) 149/76 97.6 °F (36.4 °C) Infrared 83 18 94 %   03/08/23 1545 (!) 158/78 97.5 °F (36.4 °C) Infrared 85 18 96 %   03/08/23 1325 -- -- -- -- 16 --   03/08/23 1115 123/77 97.6 °F (36.4 °C) Temporal 72 20 95 %       General: alert, appears stated age, and cooperative   Wound: Wound clean and dry no evidence of infection. , No Erythema, No Drainage, Wound Intact, and Positive for Edema   Motion: ROM not assessed in affected extremities due to recent fracture or fixation. DVT Exam: No evidence of DVT seen on physical exam.  Negative Michelle's sign. No cords or calf tenderness. No significant calf/ankle edema. Additional exam: diffuse tenderness along the left thumb mostly to the proximal phalange. Poor ROM with flexion dn extension due to pain with mild swelling noted     Data Review  CBC:   Lab Results   Component Value Date/Time    WBC 10.5 03/09/2023 07:06 AM    RBC 3.72 03/09/2023 07:06 AM    HGB 12.5 03/09/2023 07:06 AM    HCT 37.0 03/09/2023 07:06 AM     03/09/2023 07:06 AM           Assessment:      Status post I&D with ORIF right thumb  Proximal left humerus fracture   Pain of left thumb.      Plan:      1:  ordered an xray of the left hand to eval for fracture of left hand due to moderate pain   2:  Continue Deep venous thrombosis prophylaxis  3:  Changed Pain Control will change the norco to oxycodone for better pain control as needed for pain. 4. PT/OT to work the left elbow wrist and hand maintain shoulder precautions limiting ROM of shoulder. Non weight bearing on the left arm   5: pt will need DC to nursing home for continued therapy and assistance with ADL's   6: plan for close follow up with Dr. Miladys Ruiz in 7-10 days for continued evaluation of the left shoulder. 7: changed dressings on the right hand at today's visit   8: can come out of sling for therapy, hygiene and when sitting in controlled position must be worn for transitions, walking, standing and sleeping.    9: ok for dc per ortho to SNF   Electronically signed by REBECCA Townsend on 3/9/2023 at 10:13 AM

## 2023-03-09 NOTE — PLAN OF CARE
Problem: Safety - Adult  Goal: Free from fall injury  Outcome: Progressing   Patient at risk for falls. Patient resting quietly in bed. Side rails up x 2. Bed locked in lowest position. Bed alarm on. Bedside table and call light within reach. Patient instructed to call for assistance. Patient verbalized understanding. Will continue to monitor.       Problem: Pain  Goal: Verbalizes/displays adequate comfort level or baseline comfort level  Outcome: Progressing   Tolerating PO and IV per [facility]

## 2023-03-09 NOTE — PLAN OF CARE
Problem: Safety - Adult  Goal: Free from fall injury  3/9/2023 0512 by Aislinn Crespo RN  Outcome: Progressing  Flowsheets (Taken 3/9/2023 0512)  Free From Fall Injury: Instruct family/caregiver on patient safety  3/8/2023 1955 by Ana Tomas RN  Outcome: Progressing     Problem: Discharge Planning  Goal: Discharge to home or other facility with appropriate resources  Outcome: Progressing  Flowsheets (Taken 3/9/2023 4400)  Discharge to home or other facility with appropriate resources: Identify barriers to discharge with patient and caregiver     Problem: Pain  Goal: Verbalizes/displays adequate comfort level or baseline comfort level  3/9/2023 0512 by Aislinn Crespo RN  Outcome: Progressing  Flowsheets (Taken 3/9/2023 0512)  Verbalizes/displays adequate comfort level or baseline comfort level:   Encourage patient to monitor pain and request assistance   Assess pain using appropriate pain scale   Administer analgesics based on type and severity of pain and evaluate response   Implement non-pharmacological measures as appropriate and evaluate response  Note: P3/8/2023 1955 by Ana Tomas RN  Outcome: Progressing     Problem: Skin/Tissue Integrity  Goal: Absence of new skin breakdown  Description: 1. Monitor for areas of redness and/or skin breakdown  2. Assess vascular access sites hourly  3. Every 4-6 hours minimum:  Change oxygen saturation probe site  4. Every 4-6 hours:  If on nasal continuous positive airway pressure, respiratory therapy assess nares and determine need for appliance change or resting period.   Outcome: Progressing     Problem: ABCDS Injury Assessment  Goal: Absence of physical injury  Outcome: Progressing

## 2023-03-09 NOTE — PROGRESS NOTES
Hospitalist Progress Note      PCP: Angeles Mclain, ALESSANDRA - CNP    Date of Admission: 3/7/2023    Hospital Course:   80 y.o. female Hx CHF,HTN, Anxiety,Emphysema who presented with after fall. She slipped on her way to [employer] She was walking on ramp with construction. She landed on left side. Pt had pain after fall. She had no LOC. She sustained head trauma to left side of face. Pt is otherwise coherent. In ER,vitals unremarkable. Xray right finger showed acute fracture of the base of the first digit distal phalanx. Xray left shoulder showed acute minimally displaced fracture of the left humeral neck. CXR showed Small bilateral pleural effusions and acute left humeral neck fracture. CT head/maxillofacial showed Small midline/left anterior frontal scalp hematoma,Mild-to-moderate left infraorbital/upper cheek swelling. ER provider contacted Orthopedic Surgery. Plan for OR today. Subjective:   Pt states she has pain in right hand. She has new pain in left thumb. She has been sleepy with pain medications.       Medications:  Reviewed    Infusion Medications    sodium chloride      sodium chloride       Scheduled Medications    atorvastatin  40 mg Oral Nightly    carvedilol  3.125 mg Oral BID WC    NIFEdipine  60 mg Oral BID    spironolactone  25 mg Oral Daily    valsartan  320 mg Oral Daily    sodium chloride flush  5-40 mL IntraVENous 2 times per day    PARoxetine  20 mg Oral Daily    sodium chloride flush  5-40 mL IntraVENous 2 times per day    budesonide  0.5 mg Nebulization BID    And    arformoterol tartrate  15 mcg Nebulization BID    And    tiotropium  2 puff Inhalation Daily     PRN Meds: oxyCODONE-acetaminophen, famotidine, sodium chloride flush, sodium chloride, ondansetron **OR** ondansetron, polyethylene glycol, acetaminophen **OR** acetaminophen, potassium chloride **OR** potassium alternative oral replacement **OR** potassium chloride, magnesium sulfate, senna, HYDROcodone 5 mg - acetaminophen, sodium chloride flush, sodium chloride, morphine      Intake/Output Summary (Last 24 hours) at 3/9/2023 1216  Last data filed at 3/8/2023 2138  Gross per 24 hour   Intake 610 ml   Output 1 ml   Net 609 ml         Physical Exam Performed:    /74   Pulse 82   Temp 97.8 °F (36.6 °C) (Oral)   Resp 16   Ht 5' 5\" (1.651 m)   Wt 176 lb 1.6 oz (79.9 kg)   SpO2 96%   BMI 29.30 kg/m²   General appearance:  No apparent distress, appears stated age and cooperative. HEENT:  MMM,left orbit echymosis decreased,left lip swelling  Neck: Supple, with full range of motion. No jugular venous distention. Trachea midline. Respiratory:  Normal respiratory effort. Clear to auscultation, bilaterally   Cardiovascular:  Regular rate and rhythm with normal S1/S2 without murmurs  Abdomen: Soft, non-tender, non-distended with normal bowel sounds. Musculoskeletal:  No edema bilaterally,left arm in sling, right thumb in dressing  Skin: Skin color, texture, turgor normal.  No rashes or lesions. Neurologic:  grossly non-focal.  Psychiatric:  Alert and oriented, thought content appropriate, normal insight         Labs:   Recent Labs     03/07/23  1512 03/08/23  0734 03/09/23  0706   WBC 7.8 6.8 10.5   HGB 14.0 12.9 12.5   HCT 41.9 38.9 37.0    182 175       Recent Labs     03/07/23  1512 03/08/23  0734 03/09/23  0706   * 133* 131*   K 4.4 4.2 3.9   CL 93* 99 96*   CO2 26 25 24   BUN 31* 17 13   CREATININE 0.7 0.6 <0.5*   CALCIUM 9.9 8.7 8.9       No results for input(s): AST, ALT, BILIDIR, BILITOT, ALKPHOS in the last 72 hours. Recent Labs     03/07/23  1512   INR 0.97       No results for input(s): [place] in the last 72 hours. Urinalysis:    No results found for: [place], BACTERIA, RBCUA, BLOODU, SPECGRAV, [place] [address]    Radiology:  XR SHOULDER LEFT (MIN 2 VIEWS)   Final Result   1. Acute minimally displaced fracture of the left humeral neck. XR FINGER RIGHT (MIN 2 VIEWS)   Final Result   1.   Acute fracture of the base of the first digit distal phalanx. XR CHEST PORTABLE   Final Result   1. Small bilateral pleural effusions. 2.  Acute fracture of the left humeral neck      CT MAXILLOFACIAL WO CONTRAST   Final Result   HEAD:   Small midline/left anterior frontal scalp hematoma with no underlying calvarial fracture, intrarenal hemorrhage, or mass effect. Mild atrophy and chronic small vessel ischemic change. FACE:   Mild-to-moderate left infraorbital/upper cheek swelling, with no underlying acute facial fracture. CT Head W/O Contrast   Final Result   HEAD:   Small midline/left anterior frontal scalp hematoma with no underlying calvarial fracture, intrarenal hemorrhage, or mass effect. Mild atrophy and chronic small vessel ischemic change. FACE:   Mild-to-moderate left infraorbital/upper cheek swelling, with no underlying acute facial fracture. XR HAND LEFT (MIN 3 VIEWS)    (Results Pending)           Assessment/Plan:    Active Hospital Problems    Diagnosis     Fx humeral neck, left, closed, initial encounter [S42.212A]      Priority: Medium    Closed fracture of left proximal humerus [S42.202A]      Priority: Medium    Open displaced fracture of proximal phalanx of right thumb [S62.511B]      Priority: Medium     Left Humeral neck fracture  -Orthopedic Surgery consulted. Recommend ORIF the next week as outpatient.  -Pain control  -PT/OT consulted. Plan for rehab. Displaced first digit distal phalanx fracture. -Orthopedic Surgery consulted. s/p internal fixation,debridement, I&D of right thumb on 3/8/23. FU 1week. -Pain control     Left thumb pain  -FU Xrays     Left infraorbital/upper cheek swelling  -Supportive care     Small midline/left anterior frontal scalp hematoma  -Supportive care. Reduced. Restart DVT ppx.      Mechanical fall  -PT/OT  -Fall precautions     Hypertension  -BP controlled     Chronic diastolic CHF  Hx Takotsubo cardimyopathy with LV thrombus in 2017  -Pt is compensated. Pt is on ARB and coreg.  -Telemetry. Hx PVCs  -Per outpatient Cardiology note, pt wore Holter and event recorder no Afib seen. Emphysema  -Pt is on trelegy and follows with Pulmonology outpatient. DVT Prophylaxis: Lovenox  Diet: ADULT DIET; Regular  Code Status: Full Code    PT/OT Eval Status: ordered    Dispo - Plan for DC to SNF. Pt is stable for discharge.     Yanira Amin MD

## 2023-03-09 NOTE — PLAN OF CARE
Problem: Safety - Adult  Goal: Free from fall injury  3/9/2023 1148 by Lakhwinder Ramirez RN  Outcome: Progressing  Flowsheets (Taken 3/9/2023 1148)  Free From Fall Injury:   Jesi Scherer family/caregiver on patient safety   Based on caregiver fall risk screen, instruct family/caregiver to ask for assistance with transferring infant if caregiver noted to have fall risk factors     Problem: Discharge Planning  Goal: Discharge to home or other facility with appropriate resources  3/9/2023 1148 by Lakhwinder Ramirez RN  Outcome: Progressing  Flowsheets (Taken 3/9/2023 1148)  Discharge to home or other facility with appropriate resources:   Identify barriers to discharge with patient and caregiver   Arrange for needed discharge resources and transportation as appropriate  3/9/2023 0512 by Aamir Worthy RN  Outcome: Progressing  Flowsheets (Taken 3/9/2023 3969)  Discharge to home or other facility with appropriate resources: Identify barriers to discharge with patient and caregiver     Problem: Pain  Goal: Verbalizes/displays adequate comfort level or baseline comfort level  3/9/2023 1148 by Lakhwinder Ramirez RN  Outcome: Progressing  Flowsheets (Taken 3/9/2023 1148)  Verbalizes/displays adequate comfort level or baseline comfort level:   Encourage patient to monitor pain and request assistance   Assess pain using appropriate pain scale     Problem: Skin/Tissue Integrity  Goal: Absence of new skin breakdown  Description: 1. Monitor for areas of redness and/or skin breakdown  2. Assess vascular access sites hourly  3. Every 4-6 hours minimum:  Change oxygen saturation probe site  4. Every 4-6 hours:  If on nasal continuous positive airway pressure, respiratory therapy assess nares and determine need for appliance change or resting period.   3/9/2023 1148 by Lakhwinder Ramirez RN  Outcome: Progressing  Note: Encourage pt to turn and reposition frequently

## 2023-03-09 NOTE — CARE COORDINATION
SW met with patient at bedside. Patient had 4 options for SNF's picked out that her and her spouse felt were a good fit for discharge. SW to fax referral paperwork to selected SNF's. Patient has no more questions at this time and asked SW to come back once patient's spouse arrives in case there were more questions for SW. SW following for SNF discharge. 3:39 PM     SW spoke with patient and spouse at bedside to update on the SNF acceptance process. SW updated patient and spouse that [facility] would accept patient's insurance and had a bed available for her. Patient and spouse were happy to hear that patient would be accepted at [facility] SNF but wanted to wait on the other three SNF referrals sent out before making a final decision. SW to follow up with SNF referrals and patient in the AM. Anticipating patient will discharge to SNF via ambulance in 1-2 days. SW following.

## 2023-03-09 NOTE — PROGRESS NOTES
Patient alert and oriented x4. Taking Norco and morphine for left shoulder and right thumb pain. Ambulating to bathroom with contact guard assist with gait belt. VSS. On room IV fluids stopped.

## 2023-03-10 ENCOUNTER — APPOINTMENT (OUTPATIENT)
Dept: GENERAL RADIOLOGY | Age: 88
DRG: 513 | End: 2023-03-10
Payer: MEDICARE

## 2023-03-10 LAB
ANION GAP SERPL CALCULATED.3IONS-SCNC: 7 MMOL/L (ref 3–16)
BUN BLDV-MCNC: 19 MG/DL (ref 7–20)
CALCIUM SERPL-MCNC: 8.7 MG/DL (ref 8.3–10.6)
CHLORIDE BLD-SCNC: 95 MMOL/L (ref 99–110)
CO2: 27 MMOL/L (ref 21–32)
CREAT SERPL-MCNC: 0.6 MG/DL (ref 0.6–1.2)
GFR SERPL CREATININE-BSD FRML MDRD: >60 ML/MIN/{1.73_M2}
GLUCOSE BLD-MCNC: 121 MG/DL (ref 70–99)
HCT VFR BLD CALC: 32.2 % (ref 36–48)
HEMOGLOBIN: 11 G/DL (ref 12–16)
MCH RBC QN AUTO: 33.9 PG (ref 26–34)
MCHC RBC AUTO-ENTMCNC: 34 G/DL (ref 31–36)
MCV RBC AUTO: 99.7 FL (ref 80–100)
OSMOLALITY URINE: 758 MOSM/KG (ref 390–1070)
OSMOLALITY: 278 MOSM/KG (ref 278–305)
PDW BLD-RTO: 13.1 % (ref 12.4–15.4)
PLATELET # BLD: 152 K/UL (ref 135–450)
PMV BLD AUTO: 9.5 FL (ref 5–10.5)
POTASSIUM REFLEX MAGNESIUM: 4.3 MMOL/L (ref 3.5–5.1)
RBC # BLD: 3.23 M/UL (ref 4–5.2)
SODIUM BLD-SCNC: 129 MMOL/L (ref 136–145)
SODIUM BLD-SCNC: 131 MMOL/L (ref 136–145)
SODIUM URINE: 28 MMOL/L
URIC ACID, SERUM: 3 MG/DL (ref 2.6–6)
WBC # BLD: 9.7 K/UL (ref 4–11)

## 2023-03-10 PROCEDURE — 2580000003 HC RX 258: Performed by: ORTHOPAEDIC SURGERY

## 2023-03-10 PROCEDURE — 97530 THERAPEUTIC ACTIVITIES: CPT

## 2023-03-10 PROCEDURE — 96372 THER/PROPH/DIAG INJ SC/IM: CPT

## 2023-03-10 PROCEDURE — 97116 GAIT TRAINING THERAPY: CPT

## 2023-03-10 PROCEDURE — 6360000002 HC RX W HCPCS: Performed by: INTERNAL MEDICINE

## 2023-03-10 PROCEDURE — 84300 ASSAY OF URINE SODIUM: CPT

## 2023-03-10 PROCEDURE — 80048 BASIC METABOLIC PNL TOTAL CA: CPT

## 2023-03-10 PROCEDURE — 84550 ASSAY OF BLOOD/URIC ACID: CPT

## 2023-03-10 PROCEDURE — 94640 AIRWAY INHALATION TREATMENT: CPT

## 2023-03-10 PROCEDURE — 6370000000 HC RX 637 (ALT 250 FOR IP): Performed by: ORTHOPAEDIC SURGERY

## 2023-03-10 PROCEDURE — 83935 ASSAY OF URINE OSMOLALITY: CPT

## 2023-03-10 PROCEDURE — 73030 X-RAY EXAM OF SHOULDER: CPT

## 2023-03-10 PROCEDURE — 94761 N-INVAS EAR/PLS OXIMETRY MLT: CPT

## 2023-03-10 PROCEDURE — 84295 ASSAY OF SERUM SODIUM: CPT

## 2023-03-10 PROCEDURE — 6370000000 HC RX 637 (ALT 250 FOR IP): Performed by: PHYSICIAN ASSISTANT

## 2023-03-10 PROCEDURE — 1200000000 HC SEMI PRIVATE

## 2023-03-10 PROCEDURE — 99024 POSTOP FOLLOW-UP VISIT: CPT | Performed by: PHYSICIAN ASSISTANT

## 2023-03-10 PROCEDURE — 51798 US URINE CAPACITY MEASURE: CPT

## 2023-03-10 PROCEDURE — 97535 SELF CARE MNGMENT TRAINING: CPT

## 2023-03-10 PROCEDURE — 83930 ASSAY OF BLOOD OSMOLALITY: CPT

## 2023-03-10 PROCEDURE — 36415 COLL VENOUS BLD VENIPUNCTURE: CPT

## 2023-03-10 PROCEDURE — APPNB45 APP NON BILLABLE 31-45 MINUTES: Performed by: PHYSICIAN ASSISTANT

## 2023-03-10 PROCEDURE — 85027 COMPLETE CBC AUTOMATED: CPT

## 2023-03-10 RX ORDER — CEFADROXIL 500 MG/1
500 CAPSULE ORAL 2 TIMES DAILY
Qty: 20 CAPSULE | Refills: 0 | Status: SHIPPED | OUTPATIENT
Start: 2023-03-10 | End: 2023-03-20

## 2023-03-10 RX ORDER — OXYCODONE HYDROCHLORIDE 5 MG/1
5 TABLET ORAL EVERY 6 HOURS PRN
Qty: 12 TABLET | Refills: 0 | Status: SHIPPED | OUTPATIENT
Start: 2023-03-10 | End: 2023-03-13

## 2023-03-10 RX ORDER — ENOXAPARIN SODIUM 100 MG/ML
40 INJECTION SUBCUTANEOUS DAILY
Status: DISCONTINUED | OUTPATIENT
Start: 2023-03-11 | End: 2023-03-13 | Stop reason: HOSPADM

## 2023-03-10 RX ORDER — ASPIRIN 81 MG/1
81 TABLET ORAL
Qty: 60 TABLET | Refills: 0 | Status: SHIPPED | OUTPATIENT
Start: 2023-03-13

## 2023-03-10 RX ADMIN — ARFORMOTEROL TARTRATE 15 MCG: 15 SOLUTION RESPIRATORY (INHALATION) at 11:07

## 2023-03-10 RX ADMIN — CARVEDILOL 3.12 MG: 6.25 TABLET, FILM COATED ORAL at 07:59

## 2023-03-10 RX ADMIN — ENOXAPARIN SODIUM 30 MG: 100 INJECTION SUBCUTANEOUS at 07:58

## 2023-03-10 RX ADMIN — PAROXETINE HYDROCHLORIDE 20 MG: 20 TABLET, FILM COATED ORAL at 07:59

## 2023-03-10 RX ADMIN — SODIUM CHLORIDE, PRESERVATIVE FREE 10 ML: 5 INJECTION INTRAVENOUS at 20:33

## 2023-03-10 RX ADMIN — SODIUM CHLORIDE, PRESERVATIVE FREE 10 ML: 5 INJECTION INTRAVENOUS at 08:02

## 2023-03-10 RX ADMIN — OXYCODONE AND ACETAMINOPHEN 1 TABLET: 5; 325 TABLET ORAL at 20:32

## 2023-03-10 RX ADMIN — NIFEDIPINE 60 MG: 60 TABLET, FILM COATED, EXTENDED RELEASE ORAL at 20:32

## 2023-03-10 RX ADMIN — TIOTROPIUM BROMIDE INHALATION SPRAY 2 PUFF: 3.12 SPRAY, METERED RESPIRATORY (INHALATION) at 11:08

## 2023-03-10 RX ADMIN — BUDESONIDE 500 MCG: 0.5 INHALANT RESPIRATORY (INHALATION) at 11:08

## 2023-03-10 RX ADMIN — CARVEDILOL 3.12 MG: 6.25 TABLET, FILM COATED ORAL at 17:20

## 2023-03-10 RX ADMIN — ATORVASTATIN CALCIUM 40 MG: 40 TABLET, FILM COATED ORAL at 20:32

## 2023-03-10 RX ADMIN — OXYCODONE AND ACETAMINOPHEN 1 TABLET: 5; 325 TABLET ORAL at 05:37

## 2023-03-10 RX ADMIN — HYDROCODONE BITARTRATE AND ACETAMINOPHEN 1 TABLET: 5; 325 TABLET ORAL at 17:20

## 2023-03-10 RX ADMIN — OXYCODONE AND ACETAMINOPHEN 1 TABLET: 5; 325 TABLET ORAL at 14:11

## 2023-03-10 RX ADMIN — SPIRONOLACTONE 25 MG: 25 TABLET, FILM COATED ORAL at 07:59

## 2023-03-10 RX ADMIN — VALSARTAN 320 MG: 320 TABLET, FILM COATED ORAL at 20:37

## 2023-03-10 RX ADMIN — NIFEDIPINE 60 MG: 60 TABLET, FILM COATED, EXTENDED RELEASE ORAL at 07:59

## 2023-03-10 ASSESSMENT — PAIN SCALES - GENERAL
PAINLEVEL_OUTOF10: 0
PAINLEVEL_OUTOF10: 6
PAINLEVEL_OUTOF10: 0
PAINLEVEL_OUTOF10: 5
PAINLEVEL_OUTOF10: 6
PAINLEVEL_OUTOF10: 7

## 2023-03-10 ASSESSMENT — PAIN DESCRIPTION - ORIENTATION
ORIENTATION: RIGHT;LEFT
ORIENTATION: RIGHT;LEFT
ORIENTATION_2: RIGHT
ORIENTATION: LEFT
ORIENTATION: RIGHT;LEFT;POSTERIOR

## 2023-03-10 ASSESSMENT — PAIN - FUNCTIONAL ASSESSMENT
PAIN_FUNCTIONAL_ASSESSMENT: ACTIVITIES ARE NOT PREVENTED
PAIN_FUNCTIONAL_ASSESSMENT: PREVENTS OR INTERFERES WITH ALL ACTIVE AND SOME PASSIVE ACTIVITIES
PAIN_FUNCTIONAL_ASSESSMENT: ACTIVITIES ARE NOT PREVENTED
PAIN_FUNCTIONAL_ASSESSMENT_SITE2: PREVENTS OR INTERFERES WITH MANY ACTIVE NOT PASSIVE ACTIVITIES
PAIN_FUNCTIONAL_ASSESSMENT: ACTIVITIES ARE NOT PREVENTED

## 2023-03-10 ASSESSMENT — PAIN DESCRIPTION - DESCRIPTORS
DESCRIPTORS: THROBBING
DESCRIPTORS_2: ACHING;DISCOMFORT;THROBBING
DESCRIPTORS: ACHING
DESCRIPTORS: ACHING;DISCOMFORT
DESCRIPTORS: ACHING

## 2023-03-10 ASSESSMENT — PAIN DESCRIPTION - ONSET
ONSET: ON-GOING
ONSET: ON-GOING

## 2023-03-10 ASSESSMENT — PAIN DESCRIPTION - LOCATION
LOCATION: SHOULDER;HAND
LOCATION: ARM
LOCATION_2: FINGER (COMMENT WHICH ONE)
LOCATION: ARM

## 2023-03-10 ASSESSMENT — PAIN DESCRIPTION - PAIN TYPE
TYPE: ACUTE PAIN
TYPE: SURGICAL PAIN;ACUTE PAIN

## 2023-03-10 ASSESSMENT — PAIN DESCRIPTION - FREQUENCY
FREQUENCY: CONTINUOUS
FREQUENCY: INTERMITTENT

## 2023-03-10 ASSESSMENT — PAIN DESCRIPTION - INTENSITY: RATING_2: 6

## 2023-03-10 NOTE — PROGRESS NOTES
Hospitalist Progress Note      PCP: ALESSANDRA Rodriguez CNP    Date of Admission: 3/7/2023    Hospital Course:   80 y.o. female Hx CHF,HTN, Anxiety,Emphysema who presented with after fall. She slipped on her way to [employer]. She was walking on ramp with construction. She landed on left side. Pt had pain after fall. She had no LOC. She sustained head trauma to left side of face. Pt is otherwise coherent. In ER,vitals unremarkable. Xray right finger showed acute fracture of the base of the first digit distal phalanx. Xray left shoulder showed acute minimally displaced fracture of the left humeral neck. CXR showed Small bilateral pleural effusions and acute left humeral neck fracture. CT head/maxillofacial showed Small midline/left anterior frontal scalp hematoma,Mild-to-moderate left infraorbital/upper cheek swelling. ER provider contacted Orthopedic Surgery. Plan for OR today. Subjective:   Complaints of generalized pain. She has been working with therapy.       Medications:  Reviewed    Infusion Medications    sodium chloride      sodium chloride       Scheduled Medications    [START ON 3/11/2023] enoxaparin  40 mg SubCUTAneous Daily    atorvastatin  40 mg Oral Nightly    carvedilol  3.125 mg Oral BID WC    NIFEdipine  60 mg Oral BID    spironolactone  25 mg Oral Daily    valsartan  320 mg Oral Daily    sodium chloride flush  5-40 mL IntraVENous 2 times per day    PARoxetine  20 mg Oral Daily    sodium chloride flush  5-40 mL IntraVENous 2 times per day    budesonide  0.5 mg Nebulization BID    And    arformoterol tartrate  15 mcg Nebulization BID    And    tiotropium  2 puff Inhalation Daily     PRN Meds: oxyCODONE-acetaminophen, famotidine, sodium chloride flush, sodium chloride, ondansetron **OR** ondansetron, polyethylene glycol, acetaminophen **OR** acetaminophen, potassium chloride **OR** potassium alternative oral replacement **OR** potassium chloride, magnesium sulfate, senna, HYDROcodone 5 mg - acetaminophen, sodium chloride flush, sodium chloride      Intake/Output Summary (Last 24 hours) at 3/10/2023 1350  Last data filed at 3/10/2023 0537  Gross per 24 hour   Intake 960 ml   Output 0 ml   Net 960 ml         Physical Exam Performed:    /64   Pulse 81   Temp 98 °F (36.7 °C) (Oral)   Resp 18   Ht 5' 5\" (1.651 m)   Wt 176 lb 1.6 oz (79.9 kg)   SpO2 91%   BMI 29.30 kg/m²   General appearance:  No apparent distress, appears stated age and cooperative. HEENT:  MMM,left orbit echymosis decreased,left lip swelling  Neck: Supple, with full range of motion. No jugular venous distention. Trachea midline. Respiratory:  Normal respiratory effort. Clear to auscultation, bilaterally   Cardiovascular:  Regular rate and rhythm with normal S1/S2 without murmurs  Abdomen: Soft, non-tender, non-distended with normal bowel sounds. Musculoskeletal:  No edema bilaterally,left arm in sling, right thumb in dressing  Skin: Skin color, texture, turgor normal.  No rashes or lesions. Neurologic:  grossly non-focal.  Psychiatric:  Alert and oriented, thought content appropriate, normal insight         Labs:   Recent Labs     03/08/23  0734 03/09/23  0706 03/10/23  0647   WBC 6.8 10.5 9.7   HGB 12.9 12.5 11.0*   HCT 38.9 37.0 32.2*    175 152       Recent Labs     03/08/23  0734 03/09/23  0706 03/10/23  0647   * 131* 129*   K 4.2 3.9 4.3   CL 99 96* 95*   CO2 25 24 27   BUN 17 13 19   CREATININE 0.6 <0.5* 0.6   CALCIUM 8.7 8.9 8.7       No results for input(s): AST, ALT, BILIDIR, BILITOT, ALKPHOS in the last 72 hours. Recent Labs     03/07/23  1512   INR 0.97       No results for input(s): [place] in the last 72 hours. Urinalysis:    No results found for: [place], BACTERIA, RBCUA, BLOODU, SPECGRAV, [place] [address]    Radiology:  XR SHOULDER LEFT (MIN 2 VIEWS)   Final Result      1. Displaced left humeral neck fracture without significant change in alignment.          XR HAND LEFT (MIN 3 VIEWS)   Final Result      1. Soft tissue swelling, but no acute osseous abnormality. 2.  Severe first CMC osteoarthrosis. 3.  Findings of scapholunate advanced collapse. XR SHOULDER LEFT (MIN 2 VIEWS)   Final Result   1. Acute minimally displaced fracture of the left humeral neck. XR FINGER RIGHT (MIN 2 VIEWS)   Final Result   1. Acute fracture of the base of the first digit distal phalanx. XR CHEST PORTABLE   Final Result   1. Small bilateral pleural effusions. 2.  Acute fracture of the left humeral neck      CT MAXILLOFACIAL WO CONTRAST   Final Result   HEAD:   Small midline/left anterior frontal scalp hematoma with no underlying calvarial fracture, intrarenal hemorrhage, or mass effect. Mild atrophy and chronic small vessel ischemic change. FACE:   Mild-to-moderate left infraorbital/upper cheek swelling, with no underlying acute facial fracture. CT Head W/O Contrast   Final Result   HEAD:   Small midline/left anterior frontal scalp hematoma with no underlying calvarial fracture, intrarenal hemorrhage, or mass effect. Mild atrophy and chronic small vessel ischemic change. FACE:   Mild-to-moderate left infraorbital/upper cheek swelling, with no underlying acute facial fracture. Assessment/Plan:    Active Hospital Problems    Diagnosis     Fx humeral neck, left, closed, initial encounter [S42.212A]      Priority: Medium    Closed fracture of left proximal humerus [S42.202A]      Priority: Medium    Open displaced fracture of proximal phalanx of right thumb [S62.511B]      Priority: Medium     Left Humeral neck fracture  -Orthopedic Surgery consulted. Recommend ORIF the next week as outpatient.  -Pain control  -PT/OT consulted. Plan for rehab. Displaced first digit distal phalanx fracture. -Orthopedic Surgery consulted. s/p internal fixation,debridement, I&D of right thumb on 3/8/23. FU 1week.   -Pain control     Left thumb pain  -Xrays shows Severe first CMC osteoarthrosis and Findings of scapholunate advanced collapse. Left infraorbital/upper cheek swelling  -Supportive care     Small midline/left anterior frontal scalp hematoma  -Supportive care. Reduced. Restart DVT ppx. 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  -Fluid restrict 1.5L/day     Mechanical fall  -PT/OT  -Fall precautions     Hypertension  -BP controlled     Chronic diastolic CHF  Hx Takotsubo cardimyopathy with LV thrombus in 2017  -Pt is compensated. Pt is on ARB and coreg.  -Telemetry. Hx PVCs  -Per outpatient Cardiology note, pt wore Holter and event recorder no Afib seen. Emphysema  -Pt is on trelegy and follows with Pulmonology outpatient. DVT Prophylaxis: Lovenox  Diet: ADULT DIET; Regular  Code Status: Full Code    PT/OT Eval Status: ordered    Dispo - Plan for DC to SNF. Precert started today. Probably DC on Monday. Pt is stable for discharge.     Anton Salgado MD

## 2023-03-10 NOTE — PROGRESS NOTES
Physical Therapy  Facility/Department: [facility]  Physical Therapy Daily Treatment    Name: Chirag Diaz  : 1935  MRN: [id number]  Date of Service: 3/10/2023    Discharge Recommendations: Chirag Diaz scored a 17/24 on the AM-PAC short mobility form. Current research shows that an AM-PAC score of 17 or less is typically not associated with a discharge to the patient's home setting. Based on the patient's AM-PAC score and their current functional mobility deficits, it is recommended that the patient have 3-5 sessions per week of Physical Therapy at d/c to increase the patient's independence. Please see assessment section for further patient specific details. If patient discharges prior to next session this note will serve as a discharge summary. Please see below for the latest assessment towards goals. Patient Diagnosis(es): The primary encounter diagnosis was Other closed displaced fracture of proximal end of left humerus, initial encounter. Diagnoses of Open displaced fracture of proximal phalanx of right thumb, initial encounter, Fall from standing, initial encounter, and Open displaced fracture of proximal phalanx of right thumb with routine healing, subsequent encounter were also pertinent to this visit. Past Medical History:  has a past medical history of Arthritis, Fractures, Osteoarthritis, and Osteoporosis. Past Surgical History:  has a past surgical history that includes hip surgery and Hand surgery (Right, 3/7/2023). Assessment   Assessment: Patient continues to require assist of 1 for all mobility, with increased assist needed for transfers and ambulation. WB precautions continue to be main barrier to progressing mobility safely. She requires cues for safety during transfers in order to maintain WB precautions. Patient experiences 1 LOB when cued to increase step length, requiring Saeed to correct and stepping strategy by patient.  Should would benefit from continued skilled therapy to improve balance and mobility impairments prior to d/c home. Will follow per POC. Treatment Diagnosis: decreased functional mobility  Requires PT Follow-Up: Yes  Activity Tolerance  Activity Tolerance: Patient tolerated treatment well     Plan   Physcial Therapy Plan  General Plan:  (2-5)  Current Treatment Recommendations: Strengthening, ROM, Balance training, Functional mobility training, Transfer training, Gait training, Stair training, Endurance training, Positioning, Equipment evaluation, education, & procurement, Patient/Caregiver education & training, Safety education & training, Therapeutic activities  Safety Devices  Type of Devices: Nurse notified, Call light within reach, Left in chair, Chair alarm in place     Restrictions  Position Activity Restriction  Other position/activity restrictions: up with assist, activity- day of surgery, NWB operative site; sling L UE     Subjective   General  Chart Reviewed: Yes  Patient assessed for rehabilitation services?: Yes  Additional Pertinent Hx: Patient presents to ED post fall on L shoulder and R hand. S/p DEBRIDEMENT OF OPEN FRACTURE, RIGHT THUMB AND  OPED REDUCTION OF RIGHT THUMB DISLOCATION 3/7. Head CT shows L anterior frontal scalp hematoma with no fx, mild atrophy and chronic small vessel ischemic changes. Chest XR shows B pleural effusion and acute fracture of L humeral neck. Finger XR shows acute fx base of 1st digit distal phalanx. PMH: OA, osteoporosis, fractures. Diagnosis: open displaced fracture of proximal phalanx of right thumb, initial encounter  Subjective  Subjective: Patient supine at arrival and agreeable to therapy. She says she now has pain in R shoulder.          Social/Functional History  Social/Functional History  Lives With: Spouse  Type of Home: Condo  Home Layout: Two level, Able to Live on Main level with bedroom/bathroom, Laundry in basement  Home Access: Stairs to enter with rails  Entrance Stairs - Number of Steps: 3  Bathroom Shower/Tub: Walk-in shower  Bathroom Toilet: Handicap height  Bathroom Equipment: Grab bars in shower (\"might have bench\")  Home Equipment: [place], rolling  Has the patient had two or more falls in the past year or any fall with injury in the past year?: Yes  Receives Help From: Family  ADL Assistance: [address]: Needs assistance (she does cooking, has )  Ambulation Assistance: Independent (uses cane outside of house, does not need it in the house (uses furniture))  Transfer Assistance: Independent  Active : Yes  Mode of Transportation: Car  Additional Comments:  can help with laundry and household tasks  Vision/Hearing  Vision  Vision: Impaired  Vision Exceptions: Wears glasses at all times  Hearing  Hearing: Exceptions to [facility]  Hearing Exceptions: Bilateral hearing aid    Cognition   Orientation  Overall Orientation Status: Within Normal Limits  Orientation Level: Oriented X4     Objective   Gross Assessment  AROM: Generally decreased, functional  Strength: Generally decreased, functional     Bed mobility  Rolling to Right: Supervision  Supine to Sit: Supervision (cues for no WB through R hand)  Scooting: Contact guard assistance  Transfers  Sit to Stand: Minimal Assistance (push through R forearm; 3x Saeed from bed, chair, and toilet)  Stand to Sit: Minimal Assistance (cues and Saeed for eccentric control)  Ambulation  Device: No Device  Other Apparatus:  (sling for LUE)  Assistance: Contact guard assistance;Minimal assistance (R elbow/forearm assist)  Gait Deviations: Slow Cony; Shuffles;Decreased step length;Decreased step height  Distance: 50' x 2  Comments: pt notes decreased R shoulder pain while shoulder is supported during ambulation; pt has 1 LOB corrected with Saeed     Balance  Sitting - Static: Good  Standing - Static: Good  Standing - Dynamic: Fair (1 LOB during ambulation, corrected with Saeed)    AM-PAC Score  AM-PAC Inpatient Mobility Raw Score : 17 (03/10/23 1453)  AM-PAC Inpatient T-Scale Score : 42.13 (03/10/23 1453)  Mobility Inpatient CMS 0-100% Score: 50.57 (03/10/23 1453)  Mobility Inpatient CMS G-Code Modifier : CK (03/10/23 1453)    Goals  Short Term Goals  Time Frame for Short Term Goals: discharge, all ongoing 3/10  Short Term Goal 1: supine<>sit independent  Short Term Goal 2: sit<>stand SBA  Short Term Goal 3: ambulate 150ft no AD SBA  Short Term Goal 4: ascend/descend 2 stairs Saeed  Patient Goals   Patient Goals : not stated       Education  Patient Education  Education Given To: Patient  Education Provided: Role of Therapy  Education Method: Verbal  Barriers to Learning: None  Education Outcome: Verbalized understanding      Therapy Time   Individual Concurrent Group Co-treatment   Time In 1400         Time Out 1453         Minutes 53          Timed Code Treatment Minutes:   53    Total Treatment Minutes:  [address] SPT

## 2023-03-10 NOTE — PROGRESS NOTES
Occupational Therapy  Facility/Department: [facility]  Occupational Therapy Treatment    Name: Alba Resendiz  : 1935  MRN: [id number]  Date of Service: 3/10/2023    Discharge Recommendations:  Alba Resendiz scored a  on the AM-PAC ADL Inpatient form. Current research shows that an AM-PAC score of 17 or less is typically not associated with a discharge to the patient's home setting. Based on the patient's AM-PAC score and their current ADL deficits, it is recommended that the patient have 3-5 sessions per week of Occupational Therapy at d/c to increase the patient's independence. Please see assessment section for further patient specific details. If patient discharges prior to next session this note will serve as a discharge summary. Please see below for the latest assessment towards goals. OT Equipment Recommendations  Other: should pt d/c home, recommend shower chair       Treatment Diagnosis: Impaired ADLs, mobility and UE function      Assessment   Performance deficits / Impairments: Decreased functional mobility ; Decreased ADL status; Decreased balance;Decreased strength;Decreased ROM; Decreased high-level IADLs  Assessment: Pt demo progressing mobility but demo impaired balance-  requires assist for balance at all times during ADLs and mobility. Transfers are limited by NWB status of B UE and ROM restrictions L UE. Pt requires assist for all ADLs. noted edema in both hands this date, educated pt on AROM and edema reduction strategies.  Pt planning d/c to SNF for ongoing OT tx prior to return home  REQUIRES OT FOLLOW-UP: Yes  Activity Tolerance  Activity Tolerance: Patient Tolerated treatment well        Plan   Occupational Therapy Plan  Times Per Week: 2-5  Current Treatment Recommendations: Functional mobility training, Self-Care / ADL, Safety education & training, Balance training, Patient/Caregiver education & training     Restrictions  Position Activity Restriction  Other position/activity restrictions: NWB operative site; Per ortho NP note 3/10: sling L UE-  can come out of sling for therapy, hygiene and when sitting in controlled position must be worn for transitions, walking, standing and sleeping. PT/OT to work the left elbow wrist and hand maintain shoulder precautions limiting ROM of shoulder. Non weight bearing on the left arm    Subjective   General  Chart Reviewed: Yes  Patient assessed for rehabilitation services?: Yes  Additional Pertinent Hx: Pt admitted s/p fall 3/7. sustained  Right hand thumb distal phalanx base open fracture and Left shoulder pain/ moderately displaced proximal humerus fracture. 3/7 s/p DEBRIDEMENT OF OPEN FRACTURE, RIGHT THUMB AND  OPED REDUCTION OF RIGHT THUMB DISLOCATION .   Family / Caregiver Present: Yes ( present end of session)  Diagnosis: L shoulder fracture, R thumb fracture  Subjective  Subjective: Pt in bed, agreeable to OT. reports pain in L thumb from yesterday has resolved, now has 7/10 R shoulder \"I think from overusing it since I can't use my L arm\" RN made aware and medicated pt beginning of session      Objective   Observation: Sling L UE; ace wrap R hand and forearm    Toilet Transfers  Toilet - Technique: Ambulating  Equipment Used: Standard toilet  Toilet Transfer: Minimal assistance    UE Funtion  AROM:  (limited PROM of R shoulder 2/2 pain; noted edema in fingers of both hands with 50%  ROM R hand, 80%  ROM L hand)  Education: educated pt on AROM of both hands to maintain ROM, discussed edema management strategies and positioned both UEs elevated on pillows when in chair end of session    ADL  Feeding: Dependent/Total (\"I've tried feeding myself but I can't do it\")  Toileting: Dependent/Total (hygiene after BM and underwear management)         Activity Tolerance  Activity Tolerance: Patient tolerated treatment well    Bed mobility  Rolling to Right: Supervision  Supine to Sit: Supervision (cues for no WB through R hand)  Scooting: Contact guard assistance    Transfers  Sit to stand: Minimal assistance (from bed, chair)  Stand to sit: Minimal assistance (to chair x2 reps)  Transfer Comments: effortful/difficult transfers 2/2 inability to push through UEs from transfer surface; bilat gross weakness of LEs    Functional Mobility  Equipment: no AD, therapist support R elbow  Level of Assist: contact guard to min A   Distance: to/from bathroom, hallway   Comment: 1 LOB required mod A to recover, pt SOB and required lengthy seated rest during hallway mobility    Safety Devices  Type of Devices: Nurse notified;Call light within reach; Left in chair;Chair alarm in place       AM-PAC Score  [facility] Inpatient Daily Activity Raw Score: 9 (03/10/23 1553)  AM-PAC Inpatient ADL T-Scale Score : 25.33 (03/10/23 1553)  ADL Inpatient CMS 0-100% Score: 79.59 (03/10/23 1553)  ADL Inpatient CMS G-Code Modifier : CL (03/10/23 1553)    Goals  Short Term Goals  Time Frame for Short Term Goals: discharge- no goals met, all ongoing  Short Term Goal 1: sit<>stand transfers with supervision  Short Term Goal 2: supervision standing balance during ADLs  Short Term Goal 3: min A LB dressing  Short Term Goal 4: mod A UB dressing       Therapy Time   Individual Concurrent Group Co-treatment   Time In 1400         Time Out 1454         Minutes 54         Timed Code Treatment Minutes: [address] Stacey Patel, OT

## 2023-03-10 NOTE — CARE COORDINATION
Adilson Beltrán  Female, 48year old, 1965  Weight:   64.8 kg  Phone:   [phone] Alexia Wild)  PCP:   Scott Soler MD  MRN:   [id number]  Patient Portal:   Pending  Next Appt:   03/28/2019  Canceled Surgery     Mandi Stevenson MA routed conversation to Plastics Surg Schedule Pool ; [facility] Plastics Nurse [facility] 3 days ago    [employer], [address] [facility] 3 days ago          Noted, patient's appointment has been canceled with Dr. Amador Mckeon. Will route message to Dr. Merino Adjutant pool and the surgery scheduling pool as well so they are also aware to cancel on their end. Â   Thank you. Documentation     Christopher Gerry routed conversation to [facility] Nurse Message Pool  3 days ago    Christopher Garrett 3 days ago          Patient calling to cancel MOHS surgery schedule on 4/3/2019. States that with her job and financially she wants to wait until September to schedule. Patient states she will call back when she is ready. Can be reached at [phone].         Documentation     Adilson Beltrán [address] 3 days ago SW spoke with patient and spouse at bedside in regards to SNF discharge. Patient and spouse are preparing for the SNF discharge and were asking about authorization. SW educated patient and spouse that they had been accepted at [facility] but were waiting on [facility] for approval.     Plan is for patient to discharge to SNF via ambulance once precert is approved and patient is medically ready for discharge. Ambulance transportation needing to be set up once patient is approved for SNF. Rapid COVID test needs to be completed and sent to SNF before hospital discharge. Patient and spouse are agreeable with current plan to discharge to [facility]. Patient and spouse have no more questions at this time.      [facility] admissions contact: [phone])

## 2023-03-10 NOTE — PROGRESS NOTES
Department of Orthopedic Surgery  Physician Assistant   Progress Note    Subjective:     Post op Day 3 ORIF/I&D right thumb and conservative management of left proximal humerus fracture. Systemic or Specific Complaints:Pain Control notes moderate pain around the left shoulder and in to the left thumb worried that she may have broken that thumb as well hopefull that the xrays of the hand and the shoulder on the left arm. Objective:     Patient Vitals for the past 24 hrs:   BP Temp Temp src Pulse Resp SpO2   03/10/23 0700 123/66 -- -- 79 14 --   03/10/23 0658 123/66 97.5 °F (36.4 °C) Infrared 81 18 92 %   03/10/23 0330 131/68 97.7 °F (36.5 °C) Infrared 82 12 92 %   03/09/23 2244 122/66 97.7 °F (36.5 °C) Infrared 88 17 91 %   03/09/23 2032 -- -- -- 81 22 --   03/09/23 2029 -- -- -- 83 18 92 %   03/09/23 1845 (!) 116/54 98 °F (36.7 °C) Oral 78 19 93 %   03/09/23 1500 118/61 98 °F (36.7 °C) Oral 85 18 98 %   03/09/23 1100 122/74 97.8 °F (36.6 °C) Oral 82 16 96 %         General: alert, appears stated age, and cooperative   Wound: Wound clean and dry no evidence of infection. , No Erythema, No Drainage, Wound Intact, and Positive for Edema   Motion: ROM not assessed in affected extremities due to recent fracture or fixation. DVT Exam: No evidence of DVT seen on physical exam.  Negative Michelle's sign. No cords or calf tenderness. No significant calf/ankle edema. Additional exam: diffuse tenderness along the left thumb mostly to the proximal phalange. Poor ROM with flexion dn extension due to pain with mild swelling noted     Data Review  CBC:   Lab Results   Component Value Date/Time    WBC 9.7 03/10/2023 06:47 AM    RBC 3.23 03/10/2023 06:47 AM    HGB 11.0 03/10/2023 06:47 AM    HCT 32.2 03/10/2023 06:47 AM     03/10/2023 06:47 AM           Assessment:      Status post I&D with ORIF right thumb  Proximal left humerus fracture   Pain of left thumb.      Plan:      1:  reviewed xrays of the hand and the shoulder on the left side. The shoulder films note no further displacement of the proximal neck fracture. The films of the hand   2:  Continue Deep venous thrombosis prophylaxis  3:  Changed Pain Control will change the norco to oxycodone for better pain control as needed for pain. 4. PT/OT to work the left elbow wrist and hand maintain shoulder precautions limiting ROM of shoulder. Non weight bearing on the left arm   5: pt will need DC to nursing home for continued therapy and assistance with ADL's   6: plan for close follow up with Dr. Rajesh Becerra in 7-10 days for continued evaluation of the left shoulder. 7: changed dressings on the right hand at today's visit   8: can come out of sling for therapy, hygiene and when sitting in controlled position must be worn for transitions, walking, standing and sleeping.    9: ok for dc per ortho to SNF   Electronically signed by REBECCA Shah on 3/10/2023 at 10:07 AM

## 2023-03-10 NOTE — PLAN OF CARE
Problem: Safety - Adult  Goal: Free from fall injury  Outcome: Progressing     Problem: Pain  Goal: Verbalizes/displays adequate comfort level or baseline comfort level  Outcome: Progressing     Problem: Skin/Tissue Integrity  Goal: Absence of new skin breakdown  Outcome: Progressing

## 2023-03-10 NOTE — PROGRESS NOTES
Alert and oriented x4; VSS On room air. Tolerating PO. Pain controlled per MAR. ROM increasing with therapy. Ambulating x1 CGA. Sling will stay on L arm for 6 weeks.

## 2023-03-10 NOTE — PROGRESS NOTES
Pharmacist Review and Automatic Dose Adjustment of Prophylactic Enoxaparin    The reviewing pharmacist has made an adjustment to the ordered enoxaparin dose or converted to UFH per the approved [address] [place] [address]  protocol and table as identified below. Serjio Esqueda is a 80 y.o. female. Recent Labs     03/08/23  0734 03/09/23  0706 03/10/23  0647   CREATININE 0.6 <0.5* 0.6       Estimated Creatinine Clearance: 69 mL/min (based on SCr of 0.6 mg/dL). Recent Labs     03/09/23  0706 03/10/23  0647   HGB 12.5 11.0*   HCT 37.0 32.2*    152     Recent Labs     03/07/23  1512   INR 0.97       Height:   Ht Readings from Last 1 Encounters:   03/07/23 5' 5\" (1.651 m)     Weight:  Wt Readings from Last 1 Encounters:   03/07/23 176 lb 1.6 oz (79.9 kg)       Plan: Based upon the patient's weight and renal function, the ordered enoxaparin dose of 30 mg daily has been changed/converted to 40 mg daily.       Thank you,  Verita Oppenheim, PharmD  PGY-1 Pharmacy Resident  [facility]  [id number]  3/10/2023   12:49 PM

## 2023-03-10 NOTE — PLAN OF CARE
Problem: Safety - Adult  Goal: Free from fall injury  3/10/2023 1331 by Tomasa Christensen RN  Outcome: Progressing- free of falls during ambulation and transfer  3/10/2023 0414 by Martin Sandoval RN  Outcome: Progressing     Problem: Pain  Goal: Verbalizes/displays adequate comfort level or baseline comfort level  3/10/2023 1331 by Donell Christensen RN  Outcome: Progressing- controlled per [facility]  3/10/2023 0414 by Martin Sandoval RN  Outcome: Progressing     Problem: Skin/Tissue Integrity  Goal: Absence of new skin breakdown  Description: 1. Monitor for areas of redness and/or skin breakdown  2. Assess vascular access sites hourly  3. Every 4-6 hours minimum:  Change oxygen saturation probe site  4. Every 4-6 hours:  If on nasal continuous positive airway pressure, respiratory therapy assess nares and determine need for appliance change or resting period.   3/10/2023 1331 by Donell Christensen RN  Outcome: Progressing- no new skin issues, incision looks CDI, swelling and redness progressing  3/10/2023 0414 by Martin Sandoval RN  Outcome: Progressing

## 2023-03-10 NOTE — DISCHARGE INSTR - COC
Continuity of Care Form    Patient Name: Bill Ferrari   :  1935  MRN:  [id number]    Admit date:  3/7/2023  Discharge date:  ***    Code Status Order: Full Code   Advance Directives:     Admitting Physician:  Brenda Guerra MD  PCP: Felicia Romero, APRN - CNP    Discharging Nurse: [facility] Unit/Room#: 0307/3431-11  Discharging Unit Phone Number: ***    Emergency Contact:   Extended Emergency Contact Information  Primary Emergency Contact: Rei Armas  Address: [address] [place] [address], [address] [place] [address] Phone: [phone]  Mobile Phone: [phone]  Relation: Spouse    Past Surgical History:  Past Surgical History:   Procedure Laterality Date    HAND SURGERY Right 3/7/2023    DEBRIDEMENT OF OPEN FRACTURE, RIGHT THUMB AND  OPED REDUCTION OF RIGHT THUMB DISLOCATION performed by Jessica Edwards MD at [address] [place] [address]         Immunization History:   Immunization History   Administered Date(s) Administered    COVID-19, MODERNA BLUE border, Primary or Immunocompromised, (age 12y+), IM, 100 mcg/0.5mL 2021, 2021    COVID-19, MODERNA Bivalent BOOSTER, (age 12y+), IM, 48 mcg/0.5 mL 2022       Active Problems:  Patient Active Problem List   Diagnosis Code    Tibialis tendinitis M76.829    Contusion, ankle S90.00XA    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    DDD (degenerative disc disease), thoracolumbar M51.35    Degenerative scoliosis in adult patient M41.50    Fx humeral neck, left, closed, initial encounter S42.212A    Closed fracture of left proximal humerus S42.202A    Open displaced fracture of proximal phalanx of right thumb S62.511B       Isolation/Infection:   Isolation            No Isolation          Patient Infection Status       None to display            Nurse Assessment:  Last Vital Signs: /66   Pulse 79   Temp 97.5 °F (36.4 °C) (Infrared)   Resp 14   Ht 5' 5\" (1.651 m)   Wt 176 lb 1.6 oz (79.9 kg)   SpO2 92%   BMI 29.30 kg/m²     Last documented pain score (0-10 scale): Pain Level: 0  Last Weight:   Wt Readings from Last 1 Encounters:   03/07/23 176 lb 1.6 oz (79.9 kg)     Mental Status:  Alert, oriented x4    IV Access:  - None    Nursing Mobility/ADLs:  Walking   Assisted  Transfer  Assisted  Bathing  Assisted  Dressing  Assisted  Toileting  Assisted  Feeding  Assisted  Med Admin  Assisted  Med Delivery   whole and one at a time; sitting at 90 degrees upright for best swallowing; uses a lot of water    Wound Care Documentation and Therapy:  Incision 03/07/23 Finger (Comment which one) Anterior;Right (Active)   Dressing Status Clean;Dry; Intact 03/10/23 0330   Dressing/Treatment Dry dressing 03/10/23 0330   Closure Other (Comment) 03/10/23 0330   Margins Approximated 03/07/23 1909   Incision Assessment Dry 03/10/23 0330   Drainage Amount Scant 03/07/23 2030   Drainage Description Sanguinous 03/07/23 2030   Odor None 03/10/23 0330   Number of days: 2        Elimination:  Continence: Bowel: Yes  Bladder: Yes  Urinary Catheter: None   Colostomy/Ileostomy/Ileal Conduit: No       Date of Last BM:     Intake/Output Summary (Last 24 hours) at 3/10/2023 1018  Last data filed at 3/10/2023 0537  Gross per 24 hour   Intake 1200 ml   Output 0 ml   Net 1200 ml     I/O last 3 completed shifts: In: 1330 [P.O.:1320; I.V.:10]  Out: 0     Safety Concerns: At Risk for Falls    Impairments/Disabilities:      None; hearing     Nutrition Therapy:  Current Nutrition Therapy:   - Oral Diet:  General    Routes of Feeding: Oral  Liquids: No Restrictions  Daily Fluid Restriction: no  Last Modified Barium Swallow with Video (Video Swallowing Test): not done    Treatments at the Time of Hospital Discharge:   Respiratory Treatments:   Oxygen Therapy:  is not on home oxygen therapy.   Ventilator:    - No ventilator support    Rehab Therapies: Physical Therapy and Occupational Therapy  Weight Bearing Status/Restrictions: No weight bearing restrictions  Other Medical Equipment (for information only, NOT a DME order): Other Treatments:     Patient's personal belongings (please select all that are sent with patient):  Glasses, Hearing Aides left    RN SIGNATURE:  Electronically signed by Michael Christensen RN on 3/13/23 at 3:45 PM EDT    CASE MANAGEMENT/SOCIAL WORK SECTION    Inpatient Status Date: ***    Readmission Risk Assessment Score:  Readmission Risk              Risk of Unplanned Readmission:  12           Discharging to Facility/ Agency   Name: Wiliam Hassan  Address: [facility]  Fax:      / signature: Electronically signed by LIN Conte on 3/13/23 at 2:03 PM EDT    PHYSICIAN SECTION    Prognosis: Fair    Condition at Discharge: Stable    Rehab Potential (if transferring to Rehab): Fair    Recommended Labs or Other Treatments After Discharge:   PT/OT for the right shoulder elbow wrist and hand, left elbow wrist and hand   Left shoulder to remain in sling with exception of axillary hygiene, therapy and when sitting in controlled position must be on for sleeping . Follow up with Dr. Darío Lowe in 1 week for repeat images of the left shoulder  Change dressing on hand daily to monitor wound and prevent infection      Physician Certification: I certify the above information and transfer of Effie Romero  is necessary for the continuing treatment of the diagnosis listed and that she requires [facility] for less 30 days.      Update Admission H&P: No change in H&P    PHYSICIAN SIGNATURE:  Electronically signed by Thom Ferreira MD on 3/13/23 at 11:56 AM EDT

## 2023-03-11 LAB
ANION GAP SERPL CALCULATED.3IONS-SCNC: 11 MMOL/L (ref 3–16)
BUN BLDV-MCNC: 17 MG/DL (ref 7–20)
CALCIUM SERPL-MCNC: 8.5 MG/DL (ref 8.3–10.6)
CHLORIDE BLD-SCNC: 96 MMOL/L (ref 99–110)
CO2: 25 MMOL/L (ref 21–32)
CREAT SERPL-MCNC: <0.5 MG/DL (ref 0.6–1.2)
GFR SERPL CREATININE-BSD FRML MDRD: >60 ML/MIN/{1.73_M2}
GLUCOSE BLD-MCNC: 109 MG/DL (ref 70–99)
HCT VFR BLD CALC: 32.5 % (ref 36–48)
HEMOGLOBIN: 10.9 G/DL (ref 12–16)
MCH RBC QN AUTO: 33.7 PG (ref 26–34)
MCHC RBC AUTO-ENTMCNC: 33.6 G/DL (ref 31–36)
MCV RBC AUTO: 100.5 FL (ref 80–100)
PDW BLD-RTO: 13.1 % (ref 12.4–15.4)
PLATELET # BLD: 162 K/UL (ref 135–450)
PMV BLD AUTO: 9.9 FL (ref 5–10.5)
POTASSIUM REFLEX MAGNESIUM: 4.4 MMOL/L (ref 3.5–5.1)
RBC # BLD: 3.23 M/UL (ref 4–5.2)
SODIUM BLD-SCNC: 132 MMOL/L (ref 136–145)
WBC # BLD: 9.1 K/UL (ref 4–11)

## 2023-03-11 PROCEDURE — 94761 N-INVAS EAR/PLS OXIMETRY MLT: CPT

## 2023-03-11 PROCEDURE — 6360000002 HC RX W HCPCS: Performed by: INTERNAL MEDICINE

## 2023-03-11 PROCEDURE — 85027 COMPLETE CBC AUTOMATED: CPT

## 2023-03-11 PROCEDURE — 1200000000 HC SEMI PRIVATE

## 2023-03-11 PROCEDURE — 94640 AIRWAY INHALATION TREATMENT: CPT

## 2023-03-11 PROCEDURE — 6370000000 HC RX 637 (ALT 250 FOR IP): Performed by: PHYSICIAN ASSISTANT

## 2023-03-11 PROCEDURE — 6370000000 HC RX 637 (ALT 250 FOR IP): Performed by: ORTHOPAEDIC SURGERY

## 2023-03-11 PROCEDURE — 36415 COLL VENOUS BLD VENIPUNCTURE: CPT

## 2023-03-11 PROCEDURE — 80048 BASIC METABOLIC PNL TOTAL CA: CPT

## 2023-03-11 PROCEDURE — 96372 THER/PROPH/DIAG INJ SC/IM: CPT

## 2023-03-11 PROCEDURE — 2580000003 HC RX 258: Performed by: ORTHOPAEDIC SURGERY

## 2023-03-11 RX ADMIN — OXYCODONE AND ACETAMINOPHEN 1 TABLET: 5; 325 TABLET ORAL at 06:01

## 2023-03-11 RX ADMIN — OXYCODONE AND ACETAMINOPHEN 1 TABLET: 5; 325 TABLET ORAL at 19:36

## 2023-03-11 RX ADMIN — SODIUM CHLORIDE, PRESERVATIVE FREE 10 ML: 5 INJECTION INTRAVENOUS at 19:37

## 2023-03-11 RX ADMIN — ARFORMOTEROL TARTRATE 15 MCG: 15 SOLUTION RESPIRATORY (INHALATION) at 23:39

## 2023-03-11 RX ADMIN — NIFEDIPINE 60 MG: 60 TABLET, FILM COATED, EXTENDED RELEASE ORAL at 19:35

## 2023-03-11 RX ADMIN — OXYCODONE AND ACETAMINOPHEN 1 TABLET: 5; 325 TABLET ORAL at 13:27

## 2023-03-11 RX ADMIN — ATORVASTATIN CALCIUM 40 MG: 40 TABLET, FILM COATED ORAL at 19:35

## 2023-03-11 RX ADMIN — ARFORMOTEROL TARTRATE 15 MCG: 15 SOLUTION RESPIRATORY (INHALATION) at 07:53

## 2023-03-11 RX ADMIN — VALSARTAN 320 MG: 320 TABLET, FILM COATED ORAL at 19:35

## 2023-03-11 RX ADMIN — CARVEDILOL 3.12 MG: 6.25 TABLET, FILM COATED ORAL at 08:38

## 2023-03-11 RX ADMIN — SODIUM CHLORIDE, PRESERVATIVE FREE 10 ML: 5 INJECTION INTRAVENOUS at 08:38

## 2023-03-11 RX ADMIN — CARVEDILOL 3.12 MG: 6.25 TABLET, FILM COATED ORAL at 17:18

## 2023-03-11 RX ADMIN — HYDROCODONE BITARTRATE AND ACETAMINOPHEN 1 TABLET: 5; 325 TABLET ORAL at 17:18

## 2023-03-11 RX ADMIN — NIFEDIPINE 60 MG: 60 TABLET, FILM COATED, EXTENDED RELEASE ORAL at 08:38

## 2023-03-11 RX ADMIN — TIOTROPIUM BROMIDE INHALATION SPRAY 2 PUFF: 3.12 SPRAY, METERED RESPIRATORY (INHALATION) at 07:54

## 2023-03-11 RX ADMIN — SODIUM CHLORIDE, PRESERVATIVE FREE 10 ML: 5 INJECTION INTRAVENOUS at 08:39

## 2023-03-11 RX ADMIN — HYDROCODONE BITARTRATE AND ACETAMINOPHEN 1 TABLET: 5; 325 TABLET ORAL at 08:38

## 2023-03-11 RX ADMIN — ENOXAPARIN SODIUM 40 MG: 100 INJECTION SUBCUTANEOUS at 08:38

## 2023-03-11 RX ADMIN — SODIUM CHLORIDE, PRESERVATIVE FREE 10 ML: 5 INJECTION INTRAVENOUS at 19:36

## 2023-03-11 RX ADMIN — BUDESONIDE 500 MCG: 0.5 INHALANT RESPIRATORY (INHALATION) at 07:53

## 2023-03-11 RX ADMIN — BUDESONIDE 500 MCG: 0.5 INHALANT RESPIRATORY (INHALATION) at 23:40

## 2023-03-11 RX ADMIN — PAROXETINE HYDROCHLORIDE 20 MG: 20 TABLET, FILM COATED ORAL at 08:38

## 2023-03-11 RX ADMIN — SPIRONOLACTONE 25 MG: 25 TABLET, FILM COATED ORAL at 08:38

## 2023-03-11 ASSESSMENT — PAIN DESCRIPTION - DESCRIPTORS
DESCRIPTORS: ACHING

## 2023-03-11 ASSESSMENT — PAIN SCALES - GENERAL
PAINLEVEL_OUTOF10: 7
PAINLEVEL_OUTOF10: 5
PAINLEVEL_OUTOF10: 6
PAINLEVEL_OUTOF10: 7
PAINLEVEL_OUTOF10: 3
PAINLEVEL_OUTOF10: 0
PAINLEVEL_OUTOF10: 6
PAINLEVEL_OUTOF10: 6

## 2023-03-11 ASSESSMENT — PAIN - FUNCTIONAL ASSESSMENT
PAIN_FUNCTIONAL_ASSESSMENT: ACTIVITIES ARE NOT PREVENTED

## 2023-03-11 ASSESSMENT — PAIN DESCRIPTION - LOCATION
LOCATION: HAND;SHOULDER
LOCATION: ARM;SHOULDER
LOCATION: ARM;HEAD;SHOULDER
LOCATION: ARM;HAND;SHOULDER
LOCATION: SHOULDER;HAND

## 2023-03-11 ASSESSMENT — PAIN DESCRIPTION - ORIENTATION
ORIENTATION: RIGHT;LEFT

## 2023-03-11 ASSESSMENT — PAIN DESCRIPTION - FREQUENCY
FREQUENCY: INTERMITTENT
FREQUENCY: INTERMITTENT

## 2023-03-11 ASSESSMENT — PAIN DESCRIPTION - PAIN TYPE
TYPE: SURGICAL PAIN
TYPE: SURGICAL PAIN;ACUTE PAIN

## 2023-03-11 ASSESSMENT — PAIN DESCRIPTION - ONSET
ONSET: ON-GOING
ONSET: ON-GOING

## 2023-03-11 NOTE — PLAN OF CARE
Problem: Discharge Planning  Goal: Discharge to home or other facility with appropriate resources  Outcome: Progressing     Problem: Skin/Tissue Integrity  Goal: Absence of new skin breakdown  Description: 1. Monitor for areas of redness and/or skin breakdown  2. Assess vascular access sites hourly  3. Every 4-6 hours minimum:  Change oxygen saturation probe site  4. Every 4-6 hours:  If on nasal continuous positive airway pressure, respiratory therapy assess nares and determine need for appliance change or resting period.   3/10/2023 2207 by Rosemarie Yang, RN  Outcome: Progressing  3/10/2023 1331 by Nicky Christensen, RN  Outcome: Progressing     Problem: ABCDS Injury Assessment  Goal: Absence of physical injury  Outcome: Progressing

## 2023-03-11 NOTE — PROGRESS NOTES
Pt alert & oriented x4; VSS on room air. Tolerating PO fluids and feeds with assistance. Pain controlled per MAR. Voiding adequatley; no BM this shift. Ambulated CGA x1.

## 2023-03-11 NOTE — PROGRESS NOTES
Patient resting well over night. Vitals stable. Neuro checks unchanged. Non-tele. A/O X4. PRN Percocet and Norco for pain. Plan for DC to SNF pending precert. Will continue to monitor.

## 2023-03-11 NOTE — PROGRESS NOTES
Hospitalist Progress Note      PCP: Oxana Yanes, APRN - CNP    Date of Admission: 3/7/2023    Hospital Course:   80 y.o. female Hx CHF,HTN, Anxiety,Emphysema who presented with after fall. She slipped on her way to Desk. She was walking on ramp with construction. She landed on left side. Pt had pain after fall. She had no LOC. She sustained head trauma to left side of face. Pt is otherwise coherent. In ER,vitals unremarkable. Xray right finger showed acute fracture of the base of the first digit distal phalanx. Xray left shoulder showed acute minimally displaced fracture of the left humeral neck. CXR showed Small bilateral pleural effusions and acute left humeral neck fracture. CT head/maxillofacial showed Small midline/left anterior frontal scalp hematoma,Mild-to-moderate left infraorbital/upper cheek swelling. ER provider contacted Orthopedic Surgery. Plan for OR today. Subjective:   She is sitting in chair. She didn't sleep much. She has some pain in right hand.       Medications:  Reviewed    Infusion Medications    sodium chloride      sodium chloride       Scheduled Medications    enoxaparin  40 mg SubCUTAneous Daily    atorvastatin  40 mg Oral Nightly    carvedilol  3.125 mg Oral BID WC    NIFEdipine  60 mg Oral BID    spironolactone  25 mg Oral Daily    valsartan  320 mg Oral Daily    sodium chloride flush  5-40 mL IntraVENous 2 times per day    PARoxetine  20 mg Oral Daily    sodium chloride flush  5-40 mL IntraVENous 2 times per day    budesonide  0.5 mg Nebulization BID    And    arformoterol tartrate  15 mcg Nebulization BID    And    tiotropium  2 puff Inhalation Daily     PRN Meds: oxyCODONE-acetaminophen, famotidine, sodium chloride flush, sodium chloride, ondansetron **OR** ondansetron, polyethylene glycol, acetaminophen **OR** acetaminophen, potassium chloride **OR** potassium alternative oral replacement **OR** potassium chloride, magnesium sulfate, senna, HYDROcodone 5 mg - acetaminophen, sodium chloride flush, sodium chloride      Intake/Output Summary (Last 24 hours) at 3/11/2023 1259  Last data filed at 3/11/2023 1213  Gross per 24 hour   Intake 600 ml   Output --   Net 600 ml         Physical Exam Performed:    /71   Pulse 91   Temp 98.6 °F (37 °C) (Oral)   Resp 18   Ht 5' 5\" (1.651 m)   Wt 176 lb 1.6 oz (79.9 kg)   SpO2 (!) 84%   BMI 29.30 kg/m²   General appearance:  No apparent distress, appears stated age and cooperative. HEENT:  MMM,left orbit echymosis extending down left face  Neck: Supple, with full range of motion. No jugular venous distention. Trachea midline. Respiratory:  Normal respiratory effort. Clear to auscultation, bilaterally   Cardiovascular:  Regular rate and rhythm with normal S1/S2 without murmurs  Abdomen: Soft, non-tender, non-distended with normal bowel sounds. Musculoskeletal:  No edema bilaterally,left arm in sling, right thumb in dressing  Skin: Skin color, texture, turgor normal.  No rashes or lesions. Neurologic:  grossly non-focal.  Psychiatric:  Alert and oriented, thought content appropriate, normal insight         Labs:   Recent Labs     03/09/23  0706 03/10/23  0647 03/11/23  0705   WBC 10.5 9.7 9.1   HGB 12.5 11.0* 10.9*   HCT 37.0 32.2* 32.5*    152 162       Recent Labs     03/09/23  0706 03/10/23  0647 03/11/23  0705   * 131*  129* 132*   K 3.9 4.3 4.4   CL 96* 95* 96*   CO2 24 27 25   BUN 13 19 17   CREATININE <0.5* 0.6 <0.5*   CALCIUM 8.9 8.7 8.5       No results for input(s): AST, ALT, BILIDIR, BILITOT, ALKPHOS in the last 72 hours. No results for input(s): INR in the last 72 hours. No results for input(s): Chace Ebbs in the last 72 hours. Urinalysis:    No results found for: Channie Micah, BACTERIA, RBCUA, BLOODU, SPECGRAV, [place] 994    Radiology:  XR SHOULDER LEFT (MIN 2 VIEWS)   Final Result      1. Displaced left humeral neck fracture without significant change in alignment.          XR HAND LEFT (MIN 3 VIEWS)   Final Result      1. Soft tissue swelling, but no acute osseous abnormality. 2.  Severe first CMC osteoarthrosis. 3.  Findings of scapholunate advanced collapse. XR SHOULDER LEFT (MIN 2 VIEWS)   Final Result   1. Acute minimally displaced fracture of the left humeral neck. XR FINGER RIGHT (MIN 2 VIEWS)   Final Result   1. Acute fracture of the base of the first digit distal phalanx. XR CHEST PORTABLE   Final Result   1. Small bilateral pleural effusions. 2.  Acute fracture of the left humeral neck      CT MAXILLOFACIAL WO CONTRAST   Final Result   HEAD:   Small midline/left anterior frontal scalp hematoma with no underlying calvarial fracture, intrarenal hemorrhage, or mass effect. Mild atrophy and chronic small vessel ischemic change. FACE:   Mild-to-moderate left infraorbital/upper cheek swelling, with no underlying acute facial fracture. CT Head W/O Contrast   Final Result   HEAD:   Small midline/left anterior frontal scalp hematoma with no underlying calvarial fracture, intrarenal hemorrhage, or mass effect. Mild atrophy and chronic small vessel ischemic change. FACE:   Mild-to-moderate left infraorbital/upper cheek swelling, with no underlying acute facial fracture. Assessment/Plan:    Active Hospital Problems    Diagnosis     Fx humeral neck, left, closed, initial encounter [S42.212A]      Priority: Medium    Closed fracture of left proximal humerus [S42.202A]      Priority: Medium    Open displaced fracture of proximal phalanx of right thumb [S62.511B]      Priority: Medium     Left Humeral neck fracture  -Orthopedic Surgery consulted. Recommend ORIF the next week as outpatient.  -Pain control  -PT/OT consulted. Plan for rehab. Displaced first digit distal phalanx fracture. -Orthopedic Surgery consulted. s/p internal fixation,debridement, I&D of right thumb on 3/8/23. FU 1week.   -Pain control     Left thumb pain  -Xrays shows Severe first CMC osteoarthrosis and Findings of scapholunate advanced collapse. Left infraorbital/upper cheek swelling  -Supportive care     Small midline/left anterior frontal scalp hematoma  -Supportive care. Reduced. Restart DVT ppx. Post op anemia  -Hgb trended down as expected. Monitor Hgb. 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 Continue Fluid restrict 1.5L/day     Mechanical fall  -PT/OT  -Fall precautions     Hypertension  -BP controlled     Chronic diastolic CHF  Hx Takotsubo cardimyopathy with LV thrombus in 2017  -Pt is compensated. Pt is on ARB and coreg.  -Telemetry. Hx PVCs  -Per outpatient Cardiology note, pt wore Holter and event recorder no Afib seen. Emphysema  -Pt is on trelegy and follows with Pulmonology outpatient. DVT Prophylaxis: Lovenox  Diet: ADULT DIET; Regular; 1500 ml  Code Status: Full Code    PT/OT Eval Status: ordered    Dispo - Plan for DC to SNF. Precert started 7/03/21. Probably DC on Monday. Pt is stable for discharge.     Radha Lemus MD

## 2023-03-12 LAB
ANION GAP SERPL CALCULATED.3IONS-SCNC: 9 MMOL/L (ref 3–16)
BUN BLDV-MCNC: 22 MG/DL (ref 7–20)
CALCIUM SERPL-MCNC: 8.8 MG/DL (ref 8.3–10.6)
CHLORIDE BLD-SCNC: 96 MMOL/L (ref 99–110)
CO2: 26 MMOL/L (ref 21–32)
CREAT SERPL-MCNC: 0.6 MG/DL (ref 0.6–1.2)
GFR SERPL CREATININE-BSD FRML MDRD: >60 ML/MIN/{1.73_M2}
GLUCOSE BLD-MCNC: 105 MG/DL (ref 70–99)
HCT VFR BLD CALC: 31.6 % (ref 36–48)
HEMOGLOBIN: 10.7 G/DL (ref 12–16)
MCH RBC QN AUTO: 34.2 PG (ref 26–34)
MCHC RBC AUTO-ENTMCNC: 33.9 G/DL (ref 31–36)
MCV RBC AUTO: 101.1 FL (ref 80–100)
PDW BLD-RTO: 13 % (ref 12.4–15.4)
PLATELET # BLD: 187 K/UL (ref 135–450)
PMV BLD AUTO: 9.6 FL (ref 5–10.5)
POTASSIUM REFLEX MAGNESIUM: 4.2 MMOL/L (ref 3.5–5.1)
RBC # BLD: 3.12 M/UL (ref 4–5.2)
SODIUM BLD-SCNC: 131 MMOL/L (ref 136–145)
WBC # BLD: 7.5 K/UL (ref 4–11)

## 2023-03-12 PROCEDURE — 36415 COLL VENOUS BLD VENIPUNCTURE: CPT

## 2023-03-12 PROCEDURE — 6360000002 HC RX W HCPCS: Performed by: INTERNAL MEDICINE

## 2023-03-12 PROCEDURE — 99232 SBSQ HOSP IP/OBS MODERATE 35: CPT | Performed by: ORTHOPAEDIC SURGERY

## 2023-03-12 PROCEDURE — 6370000000 HC RX 637 (ALT 250 FOR IP): Performed by: ORTHOPAEDIC SURGERY

## 2023-03-12 PROCEDURE — 1200000000 HC SEMI PRIVATE

## 2023-03-12 PROCEDURE — 94761 N-INVAS EAR/PLS OXIMETRY MLT: CPT

## 2023-03-12 PROCEDURE — 23600 CLTX PROX HUMRL FX W/O MNPJ: CPT | Performed by: ORTHOPAEDIC SURGERY

## 2023-03-12 PROCEDURE — 6370000000 HC RX 637 (ALT 250 FOR IP): Performed by: PHYSICIAN ASSISTANT

## 2023-03-12 PROCEDURE — 80048 BASIC METABOLIC PNL TOTAL CA: CPT

## 2023-03-12 PROCEDURE — 94640 AIRWAY INHALATION TREATMENT: CPT

## 2023-03-12 PROCEDURE — 85027 COMPLETE CBC AUTOMATED: CPT

## 2023-03-12 PROCEDURE — 96372 THER/PROPH/DIAG INJ SC/IM: CPT

## 2023-03-12 PROCEDURE — 2580000003 HC RX 258: Performed by: ORTHOPAEDIC SURGERY

## 2023-03-12 RX ADMIN — BUDESONIDE 500 MCG: 0.5 INHALANT RESPIRATORY (INHALATION) at 21:21

## 2023-03-12 RX ADMIN — NIFEDIPINE 60 MG: 60 TABLET, FILM COATED, EXTENDED RELEASE ORAL at 19:48

## 2023-03-12 RX ADMIN — ENOXAPARIN SODIUM 40 MG: 100 INJECTION SUBCUTANEOUS at 08:14

## 2023-03-12 RX ADMIN — VALSARTAN 320 MG: 320 TABLET, FILM COATED ORAL at 19:47

## 2023-03-12 RX ADMIN — OXYCODONE AND ACETAMINOPHEN 1 TABLET: 5; 325 TABLET ORAL at 04:32

## 2023-03-12 RX ADMIN — ARFORMOTEROL TARTRATE 15 MCG: 15 SOLUTION RESPIRATORY (INHALATION) at 11:11

## 2023-03-12 RX ADMIN — SPIRONOLACTONE 25 MG: 25 TABLET, FILM COATED ORAL at 08:14

## 2023-03-12 RX ADMIN — CARVEDILOL 3.12 MG: 6.25 TABLET, FILM COATED ORAL at 17:22

## 2023-03-12 RX ADMIN — SODIUM CHLORIDE, PRESERVATIVE FREE 10 ML: 5 INJECTION INTRAVENOUS at 08:18

## 2023-03-12 RX ADMIN — BUDESONIDE 500 MCG: 0.5 INHALANT RESPIRATORY (INHALATION) at 11:12

## 2023-03-12 RX ADMIN — TIOTROPIUM BROMIDE INHALATION SPRAY 2 PUFF: 3.12 SPRAY, METERED RESPIRATORY (INHALATION) at 11:12

## 2023-03-12 RX ADMIN — SODIUM CHLORIDE, PRESERVATIVE FREE 10 ML: 5 INJECTION INTRAVENOUS at 08:19

## 2023-03-12 RX ADMIN — HYDROCODONE BITARTRATE AND ACETAMINOPHEN 1 TABLET: 5; 325 TABLET ORAL at 13:34

## 2023-03-12 RX ADMIN — PAROXETINE HYDROCHLORIDE 20 MG: 20 TABLET, FILM COATED ORAL at 08:14

## 2023-03-12 RX ADMIN — CARVEDILOL 3.12 MG: 6.25 TABLET, FILM COATED ORAL at 08:14

## 2023-03-12 RX ADMIN — OXYCODONE AND ACETAMINOPHEN 1 TABLET: 5; 325 TABLET ORAL at 17:22

## 2023-03-12 RX ADMIN — ARFORMOTEROL TARTRATE 15 MCG: 15 SOLUTION RESPIRATORY (INHALATION) at 21:21

## 2023-03-12 RX ADMIN — ATORVASTATIN CALCIUM 40 MG: 40 TABLET, FILM COATED ORAL at 19:48

## 2023-03-12 RX ADMIN — NIFEDIPINE 60 MG: 60 TABLET, FILM COATED, EXTENDED RELEASE ORAL at 08:14

## 2023-03-12 RX ADMIN — SODIUM CHLORIDE, PRESERVATIVE FREE 10 ML: 5 INJECTION INTRAVENOUS at 19:48

## 2023-03-12 ASSESSMENT — PAIN SCALES - GENERAL
PAINLEVEL_OUTOF10: 7
PAINLEVEL_OUTOF10: 0
PAINLEVEL_OUTOF10: 6

## 2023-03-12 ASSESSMENT — PAIN DESCRIPTION - LOCATION
LOCATION: HAND;NECK;SHOULDER
LOCATION: SHOULDER
LOCATION: HAND;SHOULDER
LOCATION: SHOULDER

## 2023-03-12 ASSESSMENT — PAIN DESCRIPTION - DESCRIPTORS
DESCRIPTORS: ACHING
DESCRIPTORS: ACHING
DESCRIPTORS: ACHING;SHARP

## 2023-03-12 ASSESSMENT — PAIN DESCRIPTION - ORIENTATION
ORIENTATION: RIGHT;LEFT;MID
ORIENTATION: LEFT
ORIENTATION: RIGHT;LEFT
ORIENTATION: LEFT

## 2023-03-12 ASSESSMENT — PAIN - FUNCTIONAL ASSESSMENT
PAIN_FUNCTIONAL_ASSESSMENT: ACTIVITIES ARE NOT PREVENTED

## 2023-03-12 ASSESSMENT — PAIN DESCRIPTION - PAIN TYPE
TYPE: SURGICAL PAIN;ACUTE PAIN
TYPE: SURGICAL PAIN

## 2023-03-12 ASSESSMENT — PAIN DESCRIPTION - FREQUENCY
FREQUENCY: INTERMITTENT
FREQUENCY: INTERMITTENT

## 2023-03-12 ASSESSMENT — PAIN DESCRIPTION - ONSET
ONSET: SUDDEN
ONSET: ON-GOING

## 2023-03-12 NOTE — PLAN OF CARE
Problem: Discharge Planning  Goal: Discharge to home or other facility with appropriate resources  3/12/2023 0732 by Jessenia Christensen RN  Outcome: Progressing- waiting for pre certification for SNF  3/11/2023 1924 by Clifton Thompson RN  Outcome: Progressing     Problem: Pain  Goal: Verbalizes/displays adequate comfort level or baseline comfort level  3/12/2023 0732 by Jessenia Christensen RN  Outcome: Progressing- pain controlled with PRN pain medications per [facility]   3/11/2023 1924 by Clifton Thompson RN  Outcome: Progressing     Problem: Safety - Adult  Goal: Free from fall injury- free of injury and falls during ambulation and transfers   3/12/2023 0732 by Jessenia Christensen RN  Outcome: Progressing  3/11/2023 1924 by Clifton Thompson RN  Outcome: Progressing

## 2023-03-12 NOTE — PROGRESS NOTES
Hospitalist Progress Note      PCP: Oxana Yanes, APRN - CNP    Date of Admission: 3/7/2023    Hospital Course:   80 y.o. female Hx CHF,HTN, Anxiety,Emphysema who presented with after fall. She slipped on her way to [email or web address]. She was walking on ramp with construction. She landed on left side. Pt had pain after fall. She had no LOC. She sustained head trauma to left side of face. Pt is otherwise coherent. In ER,vitals unremarkable. Xray right finger showed acute fracture of the base of the first digit distal phalanx. Xray left shoulder showed acute minimally displaced fracture of the left humeral neck. CXR showed Small bilateral pleural effusions and acute left humeral neck fracture. CT head/maxillofacial showed Small midline/left anterior frontal scalp hematoma,Mild-to-moderate left infraorbital/upper cheek swelling. ER provider contacted Orthopedic Surgery. Plan for OR today. Subjective:   She is sitting in chair. She didn't sleep much.       Medications:  Reviewed    Infusion Medications    sodium chloride      sodium chloride       Scheduled Medications    enoxaparin  40 mg SubCUTAneous Daily    atorvastatin  40 mg Oral Nightly    carvedilol  3.125 mg Oral BID WC    NIFEdipine  60 mg Oral BID    spironolactone  25 mg Oral Daily    valsartan  320 mg Oral Daily    sodium chloride flush  5-40 mL IntraVENous 2 times per day    PARoxetine  20 mg Oral Daily    sodium chloride flush  5-40 mL IntraVENous 2 times per day    budesonide  0.5 mg Nebulization BID    And    arformoterol tartrate  15 mcg Nebulization BID    And    tiotropium  2 puff Inhalation Daily     PRN Meds: oxyCODONE-acetaminophen, famotidine, sodium chloride flush, sodium chloride, ondansetron **OR** ondansetron, polyethylene glycol, acetaminophen **OR** acetaminophen, potassium chloride **OR** potassium alternative oral replacement **OR** potassium chloride, magnesium sulfate, senna, HYDROcodone 5 mg - acetaminophen, sodium chloride flush, sodium chloride      Intake/Output Summary (Last 24 hours) at 3/12/2023 1306  Last data filed at 3/12/2023 0957  Gross per 24 hour   Intake 730 ml   Output --   Net 730 ml       Physical Exam Performed:    /72   Pulse 78   Temp 97.8 °F (36.6 °C) (Oral)   Resp 18   Ht 5' 5\" (1.651 m)   Wt 176 lb 1.6 oz (79.9 kg)   SpO2 91%   BMI 29.30 kg/m²   General appearance:  No apparent distress, appears stated age and cooperative. HEENT:  MMM,left orbit echymosis extending down left face  Neck: Supple, with full range of motion. No jugular venous distention. Trachea midline. Respiratory:  Normal respiratory effort. Clear to auscultation, bilaterally   Cardiovascular:  Regular rate and rhythm with normal S1/S2 without murmurs  Abdomen: Soft, non-tender, non-distended with normal bowel sounds. Musculoskeletal:  No edema bilaterally,left arm in sling, right thumb in dressing  Skin: Skin color, texture, turgor normal.  No rashes or lesions. Neurologic:  grossly non-focal.  Psychiatric:  Alert and oriented, thought content appropriate, normal insight         Labs:   Recent Labs     03/10/23  0647 03/11/23  0705 03/12/23  0745   WBC 9.7 9.1 7.5   HGB 11.0* 10.9* 10.7*   HCT 32.2* 32.5* 31.6*    162 187     Recent Labs     03/10/23  0647 03/11/23  0705 03/12/23  0745   *  129* 132* 131*   K 4.3 4.4 4.2   CL 95* 96* 96*   CO2 27 25 26   BUN 19 17 22*   CREATININE 0.6 <0.5* 0.6   CALCIUM 8.7 8.5 8.8     No results for input(s): AST, ALT, BILIDIR, BILITOT, ALKPHOS in the last 72 hours. No results for input(s): INR in the last 72 hours. No results for input(s): Terri Smack in the last 72 hours. Urinalysis:    No results found for: Glorietta March, BACTERIA, RBCUA, BLOODU, SPECGRAV, [place] 994    Radiology:  XR SHOULDER LEFT (MIN 2 VIEWS)   Final Result      1. Displaced left humeral neck fracture without significant change in alignment. XR HAND LEFT (MIN 3 VIEWS)   Final Result      1.   Soft tissue swelling, but no acute osseous abnormality. 2.  Severe first CMC osteoarthrosis. 3.  Findings of scapholunate advanced collapse. XR SHOULDER LEFT (MIN 2 VIEWS)   Final Result   1. Acute minimally displaced fracture of the left humeral neck. XR FINGER RIGHT (MIN 2 VIEWS)   Final Result   1. Acute fracture of the base of the first digit distal phalanx. XR CHEST PORTABLE   Final Result   1. Small bilateral pleural effusions. 2.  Acute fracture of the left humeral neck      CT MAXILLOFACIAL WO CONTRAST   Final Result   HEAD:   Small midline/left anterior frontal scalp hematoma with no underlying calvarial fracture, intrarenal hemorrhage, or mass effect. Mild atrophy and chronic small vessel ischemic change. FACE:   Mild-to-moderate left infraorbital/upper cheek swelling, with no underlying acute facial fracture. CT Head W/O Contrast   Final Result   HEAD:   Small midline/left anterior frontal scalp hematoma with no underlying calvarial fracture, intrarenal hemorrhage, or mass effect. Mild atrophy and chronic small vessel ischemic change. FACE:   Mild-to-moderate left infraorbital/upper cheek swelling, with no underlying acute facial fracture. Assessment/Plan:    Active Hospital Problems    Diagnosis     Fx humeral neck, left, closed, initial encounter [S42.212A]      Priority: Medium    Closed fracture of left proximal humerus [S42.202A]      Priority: Medium    Open displaced fracture of proximal phalanx of right thumb [S62.511B]      Priority: Medium     Left Humeral neck fracture  -Orthopedic Surgery consulted. Recommend ORIF the next week as outpatient.  -Pain control  -PT/OT consulted. Plan for rehab. Displaced first digit distal phalanx fracture. -Orthopedic Surgery consulted. s/p internal fixation,debridement, I&D of right thumb on 3/8/23. FU 1week.   -Pain control     Left thumb pain  -Xrays shows Severe first CMC osteoarthrosis and Findings of scapholunate advanced collapse. Left infraorbital/upper cheek swelling  -Supportive care     Small midline/left anterior frontal scalp hematoma  -Supportive care. Reduced. Restart DVT ppx. Post op anemia  -Hgb trended down as expected. Monitor Hgb. 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 Continue Fluid restrict 1.5L/day     Mechanical fall  -PT/OT  -Fall precautions     Hypertension  -BP controlled     Chronic diastolic CHF  Hx Takotsubo cardimyopathy with LV thrombus in 2017  -Pt is compensated. Pt is on ARB and coreg.  -Telemetry. Hx PVCs  -Per outpatient Cardiology note, pt wore Holter and event recorder no Afib seen. Emphysema  -Pt is on trelegy and follows with Pulmonology outpatient. DVT Prophylaxis: Lovenox  Diet: ADULT DIET; Regular; 1500 ml  Code Status: Full Code    PT/OT Eval Status: ordered    Dispo - Plan for DC to SNF. Precert started 6/69/67. Pt is stable for discharge.     Ronny Abel MD

## 2023-03-12 NOTE — CARE COORDINATION
DC order noted. Pt is from home w/spouse. Pt plans to DC to [facility]. Patient and spouse are agreeable with current plan to discharge to [facility]. Pt has been accepted to [facility] and will need a rapid Covid prior to DC. Per chart it appears pre-cert was possibly started on friday and is pending. [facility] admissions contact: [phone]), regarding status of pre-cert.     Electronically signed by LIN Omer, JOSUEW on 3/12/2023 at 2:19 PM  [phone]

## 2023-03-12 NOTE — PROGRESS NOTES
[id number] [facility] Orthopedic Surgery  Progress Note            CHIEF COMPLAINT:    1- Right hand pain/ thumb distal phalanx base open fracture, s/p I&D 3/7/2023. 2- Left shoulder pain/ moderately displaced proximal humerus fracture. HISTORY OF PRESENT ILLNESS:    Ms. Shade Valdivia 80 y.o.  female right handed seen today at [facility] for f/u evaluation of a right hand thumb and left shoulder injury which occurred when she tripped and fell. She is still complaining of right thumb and left shoulder pain and swelling. This is better with elevation and worse with ROM. The pain is sharp and not radiating. No other complaint. She was seen at [facility], was evaluated and I was consulted. She had I&D right thumb open fracture dislocation on 3/7/2023. Past Medical History:        Diagnosis Date    Arthritis     Fractures     Osteoarthritis     Osteoporosis        Past Surgical History:        Procedure Laterality Date    HAND SURGERY Right 3/7/2023    DEBRIDEMENT OF OPEN FRACTURE, RIGHT THUMB AND  OPED REDUCTION OF RIGHT THUMB DISLOCATION performed by Jae Mathias MD at [address] [place] [address]         Medications prior to admission:   Prior to Admission medications    Medication Sig Start Date End Date Taking? Authorizing Provider   oxyCODONE (ROXICODONE) 5 MG immediate release tablet Take 1 tablet by mouth every 6 hours as needed for Pain for up to 3 days. Intended supply: 3 days.  Take lowest dose possible to manage pain Max Daily Amount: 20 mg 3/10/23 3/13/23 Yes REBECCA Gonzalez   cefadroxil (DURICEF) 500 MG capsule Take 1 capsule by mouth 2 times daily for 10 days 3/10/23 3/20/23 Yes REBECCA Gonzalez   aspirin 81 MG EC tablet Take 1 tablet by mouth Twice a Week 3/13/23  Yes REBECCA Gonzalez   atorvastatin (LIPITOR) 40 MG tablet Take 1 tablet by mouth at bedtime 2/14/23  Yes Historical Provider, MD   carvedilol (COREG) 3.125 MG tablet Take 1 tablet by mouth 2 times daily (with meals) 4/21/22  Yes Historical Provider, MD   cloNIDine (CATAPRES) 0.1 MG tablet Take 1 tablet by mouth daily as needed 10/13/20  Yes Historical Provider, MD   famotidine (PEPCID) 20 MG tablet Take 1 tablet by mouth nightly as needed    Historical Provider, MD Flaca Hobbs 200-62.5-25 MCG/ACT AEPB inhaler Inhale 1 spray into the lungs 2 times daily 3/4/23   Historical Provider, MD   NIFEdipine (ADALAT CC) 60 MG extended release tablet Take 1 tablet by mouth 2 times daily 1/19/23   Historical Provider, MD   spironolactone (ALDACTONE) 25 MG tablet Take 1 tablet by mouth daily 2/17/23   Historical Provider, MD   valsartan (DIOVAN) 320 MG tablet Take 1 tablet by mouth daily 2/15/23   Historical Provider, MD   PARoxetine (PAXIL) 20 MG tablet  3/25/16   Historical Provider, MD   hydrochlorothiazide (HYDRODIURIL) 25 MG tablet  2/6/16   Historical Provider, MD   fluorometholone (FML) 0.1 % ophthalmic suspension  3/31/16   Historical Provider, MD       Current Medications:   Current Facility-Administered Medications: enoxaparin (LOVENOX) injection 40 mg, 40 mg, SubCUTAneous, Daily  oxyCODONE-acetaminophen (PERCOCET) 5-325 MG per tablet 1 tablet, 1 tablet, Oral, Q4H PRN  atorvastatin (LIPITOR) tablet 40 mg, 40 mg, Oral, Nightly  carvedilol (COREG) tablet 3.125 mg, 3.125 mg, Oral, BID WC  famotidine (PEPCID) tablet 20 mg, 20 mg, Oral, Nightly PRN  NIFEdipine (PROCARDIA XL) extended release tablet 60 mg, 60 mg, Oral, BID  spironolactone (ALDACTONE) tablet 25 mg, 25 mg, Oral, Daily  valsartan (DIOVAN) tablet 320 mg, 320 mg, Oral, Daily  sodium chloride flush 0.9 % injection 5-40 mL, 5-40 mL, IntraVENous, 2 times per day  sodium chloride flush 0.9 % injection 5-40 mL, 5-40 mL, IntraVENous, PRN  0.9 % sodium chloride infusion, , IntraVENous, PRN  ondansetron (ZOFRAN-ODT) disintegrating tablet 4 mg, 4 mg, Oral, Q8H PRN **OR** ondansetron (ZOFRAN) injection 4 mg, 4 mg, IntraVENous, Q6H PRN  polyethylene glycol (GLYCOLAX) packet 17 g, 17 g, Oral, Daily PRN  acetaminophen (TYLENOL) tablet 650 mg, 650 mg, Oral, Q6H PRN **OR** acetaminophen (TYLENOL) suppository 650 mg, 650 mg, Rectal, Q6H PRN  potassium chloride (KLOR-CON M) extended release tablet 40 mEq, 40 mEq, Oral, PRN **OR** potassium bicarb-citric acid (EFFER-K) effervescent tablet 40 mEq, 40 mEq, Oral, PRN **OR** potassium chloride 10 mEq/100 mL IVPB (Peripheral Line), 10 mEq, IntraVENous, PRN  magnesium sulfate 2000 mg in 50 mL IVPB premix, 2,000 mg, IntraVENous, PRN  senna (SENOKOT) 8.8 MG/5ML syrup 8.8 mg, 5 mL, Oral, BID PRN  HYDROcodone-acetaminophen (NORCO) 5-325 MG per tablet 1 tablet, 1 tablet, Oral, Q4H PRN  PARoxetine (PAXIL) tablet 20 mg, 20 mg, Oral, Daily  sodium chloride flush 0.9 % injection 5-40 mL, 5-40 mL, IntraVENous, 2 times per day  sodium chloride flush 0.9 % injection 5-40 mL, 5-40 mL, IntraVENous, PRN  0.9 % sodium chloride infusion, , IntraVENous, PRN  budesonide (PULMICORT) nebulizer suspension 500 mcg, 0.5 mg, Nebulization, BID **AND** arformoterol tartrate (BROVANA) nebulizer solution 15 mcg, 15 mcg, Nebulization, BID **AND** tiotropium (SPIRIVA RESPIMAT) 2.5 MCG/ACT inhaler 2 puff, 2 puff, Inhalation, Daily    Allergies:  Penicillins and Sulfa antibiotics    Social History     Socioeconomic History    Marital status:      Spouse name: Not on file    Number of children: Not on file    Years of education: Not on file    Highest education level: Not on file   Occupational History    Not on file   Tobacco Use    Smoking status: Former    Smokeless tobacco: Never   Substance and Sexual Activity    Alcohol use:  Yes     Alcohol/week: 1.0 standard drink     Types: 1 Glasses of wine per week    Drug use: No    Sexual activity: Never   Other Topics Concern    Not on file   Social History Narrative    Not on file     Social Determinants of Health     Financial Resource Strain: Not on file   Food Insecurity: Not on file   Transportation Needs: Not on file   Physical Activity: Not on file   Stress: Not on file   Social Connections: Not on file   Intimate Partner Violence: Not on file   Housing Stability: Not on file       Family History:  Family History   Problem Relation Age of Onset    Cancer Mother     Scoliosis Paternal Grandmother     Diabetes Paternal Grandfather          REVIEW OF SYSTEMS:   CONSTITUTIONAL: Denies unexplained weight loss, fevers, chills or fatigue  NEUROLOGICAL: Denies unsteady gait or progressive weakness    PSYCHOLOGICAL: Denies anxiety, depression   SKIN: Denies skin changes, delayed healing, rash, itching   HEMATOLOGIC: Denies easy bleeding or bruising  ENDOCRINE: Denies excessive thirst, urination, heat/cold  RESPIRATORY: Denies current dyspnea, cough  CARDIOVASCULAR: Negative for chest pain at this time. EYES: Negative for photophobia and visual disturbance. ENT:  Negative for rhinorrhea, epistaxis, sore throat, or hearing loss. GI: Denies nausea, vomiting, diarrhea   : Denies bowel or bladder issues   MUSCULOSKELETAL: Right thumb and left shoulder pain. All other ROS reviewed in chart or with patient or family and are grossly negative. PHYSICAL EXAMINATION:  Ms. Marianne Cortes is a very pleasant 80 y.o. female who seen today in no acute distress, awake, alert, and oriented. She is well nourished and groomed. Patient with normal affect. Body mass index is 29.3 kg/m². . Skin warm and dry. Resting respiratory rate is 16. Resp deep and easy. Pulse is with regular rate and rhythm    /66   Pulse 75   Temp 98 °F (36.7 °C) (Oral)   Resp 14   Ht 5' 5\" (1.651 m)   Wt 176 lb 1.6 oz (79.9 kg)   SpO2 96%   BMI 29.30 kg/m²        Airway is intact  Chest: chest clear, no wheezing, rales, normal symmetric air entry, no tachypnea, retractions or cyanosis  Heart: regular rate and rhythm ; heart sounds normal   Hearing intact, pupil equal and reactive bilateral  Lymphatics; No groin or axillary enlarged lymph nodes. Neck;  No swelling  Abdomen; soft, non distended. MUSCULOSKELETAL:   Examination of both Upper extremities showing a decreased range of motion of the right thumb compare to the other side. There is mild swelling that can be seen, Drsg D/C/I of the thumb. She has intact sensation and good radial pulses. She has mild tenderness on deep palpation over the distal phalanx of the thumb right hand. There is no rotational deformity of the right thumb. There is moderate swelling and minimal ecchymosis left shoulder. She is tender to palpation over the shoulder, and otherwise non tender over the remainder of the extremity. Range of motion is decreased secondary to pain over the left shoulder. The skin overlying the left shoulder is intact without evidence of lesion, laceration. Distal pulses are 2+ and symmetric bilaterally. Sensation is grossly intact to light touch and symmetric bilaterally. NEUROLOGIC:   Sensory:    Touch:                     Right Upper Extremity:  normal                   Left Upper Extremity:  normal                  Right Lower Extremity:  normal                  Left Lower Extremity:  normal        DATA:    CBC:   Lab Results   Component Value Date/Time    WBC 7.5 03/12/2023 07:45 AM    RBC 3.12 03/12/2023 07:45 AM    HGB 10.7 03/12/2023 07:45 AM    HCT 31.6 03/12/2023 07:45 AM    .1 03/12/2023 07:45 AM    MCH 34.2 03/12/2023 07:45 AM    MCHC 33.9 03/12/2023 07:45 AM    RDW 13.0 03/12/2023 07:45 AM     03/12/2023 07:45 AM    MPV 9.6 03/12/2023 07:45 AM     WBC:    Lab Results   Component Value Date/Time    WBC 7.5 03/12/2023 07:45 AM     PT/INR:    Lab Results   Component Value Date/Time    PROTIME 12.8 03/07/2023 03:12 PM    INR 0.97 03/07/2023 03:12 PM     PTT:  No results found for: APTT[APTT    IMAGING:  Xray's were reviewed, dated 3/7/2023,  3 views of the right hand, and showed a thumb distal phalanx base minimally displaced fracture.       Xrays dated 3/10/2023, 3 views of left shoulder were reviewed, and showed mildly displaced proximal humerus fracture, stable. IMPRESSION:  1- Right hand pain/ thumb distal phalanx base open fracture, s/p I&D 3/7/2023. 2- Left shoulder pain/ mildly displaced proximal humerus fracture. PLAN:  I discussed with Edel Plascencia the overall alignment of the right thumb open fracture and treatment options. At this point she is allow to start ROM, but NWB right thumb. Regarding her left proximal humerus,  I discussed with Edel Plascencia the treatment options and that the overall alignment of this fracture is stable and acceptable at this point and we can try to treat this non-operatively in a sling, NWB. We discussed the risk of nonunion and or malunion. She will need a Rehab placement.       Helen Luo MD   3/12/2023  9:23 AM

## 2023-03-12 NOTE — PROGRESS NOTES
Patient resting well over night. No acute changes. Vitals stable. A/O X4, neuro unchanged. PRN Percocet for pain. Dressing CDI. Splint in place per orders. Plan for DC to SNF on Monday. Will continue to monitor.

## 2023-03-13 VITALS
DIASTOLIC BLOOD PRESSURE: 73 MMHG | SYSTOLIC BLOOD PRESSURE: 98 MMHG | OXYGEN SATURATION: 96 % | RESPIRATION RATE: 18 BRPM | TEMPERATURE: 98 F | HEIGHT: 65 IN | BODY MASS INDEX: 29.34 KG/M2 | WEIGHT: 176.1 LBS | HEART RATE: 89 BPM

## 2023-03-13 LAB
ANION GAP SERPL CALCULATED.3IONS-SCNC: 8 MMOL/L (ref 3–16)
BUN BLDV-MCNC: 25 MG/DL (ref 7–20)
CALCIUM SERPL-MCNC: 9.1 MG/DL (ref 8.3–10.6)
CHLORIDE BLD-SCNC: 97 MMOL/L (ref 99–110)
CO2: 29 MMOL/L (ref 21–32)
CREAT SERPL-MCNC: 0.6 MG/DL (ref 0.6–1.2)
GFR SERPL CREATININE-BSD FRML MDRD: >60 ML/MIN/{1.73_M2}
GLUCOSE BLD-MCNC: 109 MG/DL (ref 70–99)
HCT VFR BLD CALC: 33 % (ref 36–48)
HEMOGLOBIN: 11.2 G/DL (ref 12–16)
MCH RBC QN AUTO: 33.8 PG (ref 26–34)
MCHC RBC AUTO-ENTMCNC: 34 G/DL (ref 31–36)
MCV RBC AUTO: 99.4 FL (ref 80–100)
PDW BLD-RTO: 12.8 % (ref 12.4–15.4)
PLATELET # BLD: 214 K/UL (ref 135–450)
PMV BLD AUTO: 9 FL (ref 5–10.5)
POTASSIUM REFLEX MAGNESIUM: 4.1 MMOL/L (ref 3.5–5.1)
RBC # BLD: 3.32 M/UL (ref 4–5.2)
SARS-COV-2, NAAT: NOT DETECTED
SODIUM BLD-SCNC: 134 MMOL/L (ref 136–145)
WBC # BLD: 5.3 K/UL (ref 4–11)

## 2023-03-13 PROCEDURE — 80048 BASIC METABOLIC PNL TOTAL CA: CPT

## 2023-03-13 PROCEDURE — 6370000000 HC RX 637 (ALT 250 FOR IP): Performed by: PHYSICIAN ASSISTANT

## 2023-03-13 PROCEDURE — 96372 THER/PROPH/DIAG INJ SC/IM: CPT

## 2023-03-13 PROCEDURE — 85027 COMPLETE CBC AUTOMATED: CPT

## 2023-03-13 PROCEDURE — 6370000000 HC RX 637 (ALT 250 FOR IP): Performed by: ORTHOPAEDIC SURGERY

## 2023-03-13 PROCEDURE — APPNB45 APP NON BILLABLE 31-45 MINUTES: Performed by: PHYSICIAN ASSISTANT

## 2023-03-13 PROCEDURE — 87635 SARS-COV-2 COVID-19 AMP PRB: CPT

## 2023-03-13 PROCEDURE — 2580000003 HC RX 258: Performed by: ORTHOPAEDIC SURGERY

## 2023-03-13 PROCEDURE — 36415 COLL VENOUS BLD VENIPUNCTURE: CPT

## 2023-03-13 PROCEDURE — 6360000002 HC RX W HCPCS: Performed by: INTERNAL MEDICINE

## 2023-03-13 PROCEDURE — 99024 POSTOP FOLLOW-UP VISIT: CPT | Performed by: PHYSICIAN ASSISTANT

## 2023-03-13 PROCEDURE — 94761 N-INVAS EAR/PLS OXIMETRY MLT: CPT

## 2023-03-13 RX ADMIN — PAROXETINE HYDROCHLORIDE 20 MG: 20 TABLET, FILM COATED ORAL at 08:09

## 2023-03-13 RX ADMIN — CARVEDILOL 3.12 MG: 6.25 TABLET, FILM COATED ORAL at 08:09

## 2023-03-13 RX ADMIN — SPIRONOLACTONE 25 MG: 25 TABLET, FILM COATED ORAL at 08:09

## 2023-03-13 RX ADMIN — OXYCODONE AND ACETAMINOPHEN 1 TABLET: 5; 325 TABLET ORAL at 01:12

## 2023-03-13 RX ADMIN — SODIUM CHLORIDE, PRESERVATIVE FREE 10 ML: 5 INJECTION INTRAVENOUS at 08:13

## 2023-03-13 RX ADMIN — HYDROCODONE BITARTRATE AND ACETAMINOPHEN 1 TABLET: 5; 325 TABLET ORAL at 08:09

## 2023-03-13 RX ADMIN — NIFEDIPINE 60 MG: 60 TABLET, FILM COATED, EXTENDED RELEASE ORAL at 08:09

## 2023-03-13 RX ADMIN — ENOXAPARIN SODIUM 40 MG: 100 INJECTION SUBCUTANEOUS at 08:09

## 2023-03-13 RX ADMIN — OXYCODONE AND ACETAMINOPHEN 1 TABLET: 5; 325 TABLET ORAL at 16:54

## 2023-03-13 ASSESSMENT — PAIN DESCRIPTION - ORIENTATION
ORIENTATION: LEFT

## 2023-03-13 ASSESSMENT — PAIN DESCRIPTION - DESCRIPTORS
DESCRIPTORS: ACHING

## 2023-03-13 ASSESSMENT — PAIN SCALES - GENERAL
PAINLEVEL_OUTOF10: 5
PAINLEVEL_OUTOF10: 7
PAINLEVEL_OUTOF10: 7
PAINLEVEL_OUTOF10: 5

## 2023-03-13 ASSESSMENT — PAIN DESCRIPTION - LOCATION
LOCATION: SHOULDER

## 2023-03-13 ASSESSMENT — PAIN - FUNCTIONAL ASSESSMENT
PAIN_FUNCTIONAL_ASSESSMENT: ACTIVITIES ARE NOT PREVENTED
PAIN_FUNCTIONAL_ASSESSMENT: ACTIVITIES ARE NOT PREVENTED

## 2023-03-13 NOTE — PROGRESS NOTES
Department of Orthopedic Surgery  Physician Assistant   Progress Note    Subjective:     Post op Day 5 ORIF/I&D right thumb and conservative management of left proximal humerus fracture. Systemic or Specific Complaints:Pain Control notes moderate pain around the left shoulder and in to the left thumb. Notes that she is continuing with therapy and is set to be discharged today to the SNF. Objective:     Patient Vitals for the past 24 hrs:   BP Temp Temp src Pulse Resp SpO2   03/13/23 0914 -- -- -- 76 18 95 %   03/13/23 0703 131/68 97.1 °F (36.2 °C) Oral 80 18 95 %   03/13/23 0142 -- -- -- -- 18 --   03/13/23 0104 139/74 97.5 °F (36.4 °C) Temporal 83 18 91 %   03/12/23 2121 -- -- -- -- -- 93 %   03/12/23 1941 137/72 97.7 °F (36.5 °C) Temporal 74 18 93 %   03/12/23 1839 122/77 97.9 °F (36.6 °C) Oral 81 16 94 %   03/12/23 1436 103/66 98 °F (36.7 °C) Oral 83 16 92 %   03/12/23 1116 120/72 97.8 °F (36.6 °C) Oral 78 18 91 %         General: alert, appears stated age, and cooperative   Wound: Wound clean and dry no evidence of infection. , No Erythema, No Drainage, Wound Intact, and Positive for Edema   Motion: ROM not assessed in affected extremities due to recent fracture or fixation. DVT Exam: No evidence of DVT seen on physical exam.  Negative Michelle's sign. No cords or calf tenderness. No significant calf/ankle edema. Additional exam: improved tenderness along the left thumb mostly to the proximal phalange. Improved ROM of left and right thumb with flexion and extension but still stiff due to pain with mild swelling noted     Data Review  CBC:   Lab Results   Component Value Date/Time    WBC 5.3 03/13/2023 06:51 AM    RBC 3.32 03/13/2023 06:51 AM    HGB 11.2 03/13/2023 06:51 AM    HCT 33.0 03/13/2023 06:51 AM     03/13/2023 06:51 AM           Assessment:      Status post I&D with ORIF right thumb  Proximal left humerus fracture   Pain of left thumb.      Plan:      1:  reviewed xrays of the hand and the shoulder on the left side. The shoulder films note no further displacement of the proximal neck fracture. The films of the hand   2:  Continue Deep venous thrombosis prophylaxis  3:  Changed Pain Control will change the norco to oxycodone for better pain control as needed for pain. 4. PT/OT to work the left elbow wrist and hand maintain shoulder precautions limiting ROM of shoulder. Non weight bearing on the left arm   5: pt will need DC to nursing home for continued therapy and assistance with ADL's   6: plan for close follow up with Dr. Jayro Eagle in 7-10 days for continued evaluation of the left shoulder. 7: changed dressings on the right hand at today's visit   8: can come out of sling for therapy, hygiene and when sitting in controlled position must be worn for transitions, walking, standing and sleeping.    9: ok for dc per ortho to SNF   Electronically signed by REBECCA Heaton on 3/13/2023 at 11:11 AM

## 2023-03-13 NOTE — ADT AUTH CERT
PA REC IP by Jenny Gomes RN       Review Status Review Entered   In Primary 3/9/2023 0905       Created By   Jenny Gomes RN      Criteria Review   slr keep in  We recommend that the following pt's current hospitalization under INPATIENT status is APPROPRIATE . Name: Valentine Jo   : 1935   CSN: [id number]   INSURANCE: BCBS Medicare        Clinical summary Assessment/Plan:     Active Hospital Problems    Diagnosis    · Fx humeral neck, left, closed, initial encounter [S42.212A]        Priority: Medium  · Closed fracture of left proximal humerus [S42.202A]        Priority: Medium  · Open displaced fracture of proximal phalanx of right thumb [S62.511B]        Priority: Medium     Left Humeral neck fracture  -Orthopedic Surgery consulted. Recommend ORIF the next week as outpatient.  -Pain control     Displaced first digit distal phalanx fracture. -Orthopedic Surgery consulted. s/p internal fixation,debridement, I&D of right thumb on 3/8/23.  -Pain control      Left infraorbital/upper cheek swelling  -Supportive care     Small midline/left anterior frontal scalp hematoma  -Supportive care. Avoiding heparin products     Mechanical fall  -PT/OT  -Fall precautions     Hypertension  -BP controlled     Chronic diastolic CHF  Hx Takotsubo cardimyopathy with LV thrombus in 2017  -Pt is compensated. Pt is on ARB and coreg.  -Telemetry. Hx PVCs  -Per outpatient Cardiology note, pt wore Holter and event recorder no Afib seen. Emphysema  -Pt is on trelegy and follows with Pulmonology outpatient.     Vitals hypertensive  Labs and Imaging reviewed  MCG criteria applies yes,   Comments Inpt marietta      This chart was reviewed at 4:36 PM 3/8/2023    [address] [place] [address]   CELL : [phone]

## 2023-03-13 NOTE — CARE COORDINATION
Case Management Assessment            Discharge Note                    Date / Time of Note: 3/13/2023 12:47 PM                  Discharge Note Completed by: LIN Balderas    Patient Name: Shoaib Smyth   YOB: 1935  Diagnosis: Open displaced fracture of proximal phalanx of right thumb, initial encounter [S62.511B]  Fall from standing, initial encounter [W19. XXXA]  Fx humeral neck, left, closed, initial encounter Froilan Kumar  Other closed displaced fracture of proximal end of left humerus, initial encounter Aramis Ho   Date / Time: 3/7/2023  1:31 PM    Current PCP: Cathy Gomez, ALESSANDRA - CNP  Clinic patient: No    Hospitalization in the last 30 days: No       Advance Directives:  Code Status: Full Code  [place] DNR form completed and on chart: Not Indicated    Financial:  Payor: Liya Obrien / Plan: Rikki Orellana ESSENTIAL/PLUS / Product Type: *No Product type* /      Pharmacy:    [facility] [id number] [place] [address] [address], [address]  [address] [place] [address]  Phone: [phone] Fax: [phone]      Assistance purchasing medications?: Potential Assistance Purchasing Medications: No  Assistance provided by Case Management: None at this time    Does patient want to participate in local refill/ meds to beds program?: No    Meds To Beds General Rules:  1. Can ONLY be done Monday- Friday between 8:30am-5pm  2. Prescription(s) must be in pharmacy by 3pm to be filled same day  3. Copy of patient's insurance/ prescription drug card and patient face sheet must be sent along with the prescription(s)  4. Cost of Rx cannot be added to hospital bill. If financial assistance is needed, please contact unit  or ;  or  CANNOT provide pharmacy voucher for patients co-pays  5.  Patients can then  the prescription on their way out of the hospital at discharge, or pharmacy can deliver to the bedside if staff is available. (payment due at time of pick-up or delivery - cash, check, or card accepted)     Able to afford home medications/ co-pay costs: Yes    ADLS:  Current PT AM-PAC Score: 17 /24  Current OT AM-PAC Score: 9 /24      DISCHARGE Disposition: Swapnil Burris (SNF): [facility]  Phone: [phone] Fax: [phone]    LOC at discharge: Skilled  DAKOTA Completed: Yes    Notification completed in HENS/PAS?:  Yes : CM has completed HENS online through secure website for SNF admission at 3/13/23. Document ID #: [id number]    IMM Completed:   Yes, Case management has presented and reviewed IMM letter #2 to the patient and/or family/ POA. Patient and/or family/POA verbalized understanding of their medicare rights and appeal process if needed. Patient and/or family/POA has signed, initialed and placed today's date (3/13/23) and time ([phone]) on letter #2 on the the appropriate lines. Patient and/or family/POA, copy of signed letter provided and they are aware that this original copy of IMM letter #2 is available prior to discharge from the paper chart on the unit. Electronic documentation has been entered into epic for IMM letter #2 and original paper copy has been added to the paper chart at the nurses station.           Transportation:  Transportation PLAN for discharge: EMS transportation   Mode of Transport: Ambulance stretcher - BLS  Reason for medical transport: Other: Patient suffered fall, cervical fracture and shoulder fracture  Name of [address]: [employer]: [phone]  Time of Transport: 1700    Transport form completed: Yes    Home Care:  [address] ordered at discharge: No  [address]: Not Applicable  Orders faxed: No    Durable Medical Equipment:  DME Provider: NA  Equipment obtained during hospitalization: NA    Home Oxygen and Respiratory Equipment:  Oxygen needed at discharge?: Not [address]: Not Applicable  Portable tank available for discharge?: Not Indicated    Dialysis:  Dialysis patient: No    Dialysis Center:  Not Applicable    Hospice Services:  Location: Not Applicable  Agency: Not Applicable    Consents signed: Not Indicated    Referrals made at DISCHARGE for outpatient continued care:  Not Applicable    Additional CM Notes: SW spoke with patient at bedside and went over discharge plan. Current plan is for patient to discharge to [facility]. Raquel @ [facility] ([phone]) noted that precert was approved and the facility was ready to take patient. Orders faxed to [phone] nurse to call report to [phone]. Patient to transfer to facility via [facility] scheduled for 1700. Patient has no more questions at this time and is agreeable with discharge plan.     The Plan for Transition of Care is related to the following treatment goals of Open displaced fracture of proximal phalanx of right thumb, initial encounter [S62.511B]  Fall from standing, initial encounter [W19.XXXA]  Fx humeral neck, left, closed, initial encounter [S42.212A]  Other closed displaced fracture of proximal end of left humerus, initial encounter [S42.292A]    The Patient and/or patient representative Danya and her family were provided with a choice of provider and agrees with the discharge plan Yes    Freedom of choice list was provided with basic dialogue that supports the patient's individualized plan of care/goals and shares the quality data associated with the providers. Yes    Care Transitions patient: No    LIN Park   [facility]  Case Management Department  Ph: [phone]  Fax: [phone]

## 2023-03-13 NOTE — PLAN OF CARE
Problem: Safety - Adult  Goal: Free from fall injury  Outcome: Completed     Problem: Discharge Planning  Goal: Discharge to home or other facility with appropriate resources  Outcome: Completed     Problem: Pain  Goal: Verbalizes/displays adequate comfort level or baseline comfort level  Outcome: Completed     Problem: Skin/Tissue Integrity  Goal: Absence of new skin breakdown  Description: 1. Monitor for areas of redness and/or skin breakdown  2. Assess vascular access sites hourly  3. Every 4-6 hours minimum:  Change oxygen saturation probe site  4. Every 4-6 hours:  If on nasal continuous positive airway pressure, respiratory therapy assess nares and determine need for appliance change or resting period.   Outcome: Completed     Problem: ABCDS Injury Assessment  Goal: Absence of physical injury  Outcome: Completed

## 2023-03-13 NOTE — DISCHARGE SUMMARY
Hospital Discharge Summary    Patient's PCP: ALESSANDRA Orellana - CNP  Admit Date: 3/7/2023   Discharge Date: 3/13/2023    Admitting Physician: Dr. Martha Sanderson MD  Discharge Physician: Dr. Alex Bautista MD     Consults:   IP CONSULT TO ORTHOPEDIC SURGERY  IP CONSULT TO HOSPITALIST  IP CONSULT TO SOCIAL WORK    Brief HPI: 80 y.o. female Hx CHF,HTN, Mina Repress who presented with after fall. She slipped on her way to [facility]. She was walking on ramp with construction. She landed on left side. Pt had pain after fall. She had no LOC. She sustained head trauma to left side of face. Pt is otherwise coherent. In ER,vitals unremarkable. Xray right finger showed acute fracture of the base of the first digit distal phalanx. Xray left shoulder showed acute minimally displaced fracture of the left humeral neck. CXR showed Small bilateral pleural effusions and acute left humeral neck fracture. CT head/maxillofacial showed Small midline/left anterior frontal scalp hematoma,Mild-to-moderate left infraorbital/upper cheek swelling. Brief hospital course:   Displaced first digit distal phalanx fracture/left shoulder injury/humeral neck fracture, after mechanical fall  -Ortho consulted  S/p I and D  right thumb open fracture dislocation on 3/7  -Left shoulder pain and mildly displaced proximal humeral fracture. Also discussed with patient that overall alignment of this fracture is stable and planning to treat nonoperatively in sling nonweightbearing. She will follow-up with Ortho as needed  -Discharge to SNF for rehab  -will follow-up with Dr. Brooklyn Sotelo in 7 to 10 days for evaluation of left shoulder  -can come out of sling for therapy, hygiene and when sitting in controlled position must be worn for transitions, walking, standing and sleeping. 2. Left infraorbital/upper cheek swelling  -Supportive care     3. Small midline/left anterior frontal scalp hematoma  -Supportive care. Reduced.       4. 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 Continue Fluid restrict 1.5L/day     5. Mechanical fall  -PT/OT  -Fall precautions     6. Hypertension  -BP controlled     7. Chronic diastolic CHF  Hx Takotsubo cardimyopathy with LV thrombus in 2017  -Pt is compensated. Pt is on ARB and coreg. 8. Hx PVCs  -Per outpatient Cardiology note, pt wore Holter and event recorder no Afib seen. 9. Emphysema  -Pt is on trelegy and follows with Pulmonology outpatient. Invasive procedures:  ORIF/I&D right thumb     Discharge Diagnoses:   Principal Problem:    Fx humeral neck, left, closed, initial encounter  Active Problems:    Closed fracture of left proximal humerus    Open displaced fracture of proximal phalanx of right thumb  Resolved Problems:    * No resolved hospital problems. *      Physical Exam: BP 98/73   Pulse 89   Temp 98.1 °F (36.7 °C) (Oral)   Resp 18   Ht 5' 5\" (1.651 m)   Wt 176 lb 1.6 oz (79.9 kg)   SpO2 96%   BMI 29.30 kg/m²       General appearance:  No apparent distress, appears stated age and cooperative. HEENT:  MMM,left orbit echymosis extending down left face  Neck: Supple, with full range of motion. No jugular venous distention. Trachea midline. Respiratory:  Normal respiratory effort. Clear to auscultation, bilaterally   Cardiovascular:  Regular rate and rhythm with normal S1/S2 without murmurs  Abdomen: Soft, non-tender, non-distended with normal bowel sounds. Musculoskeletal:  No edema bilaterally,left arm in sling, right thumb in dressing  Skin: Skin color, texture, turgor normal.  No rashes or lesions. Neurologic:  grossly non-focal.  Psychiatric:  Alert and oriented, thought content appropriate, normal insight  Significant diagnostic studies that may require follow up:  XR HAND LEFT (MIN 3 VIEWS)    Result Date: 3/9/2023  EXAM: XR HAND LEFT (MIN 3 VIEWS) INDICATION: left hand pain thumb COMPARISON: None.  TECHNIQUE: 3 views of the left hand FINDINGS: There is osseous demineralization. 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 There is widening of the scapholunate interval with proximal migration of the capitate. There is second and third MCP degenerative joint space loss. Minimal interphalangeal spurring. No evidence of acute fracture. There is soft tissue swelling. 1.  Soft tissue swelling, but no acute osseous abnormality. 2.  Severe first CMC osteoarthrosis. 3.  Findings of scapholunate advanced collapse. CT Head W/O Contrast    Result Date: 3/7/2023  EXAM: CT HEAD WO CONTRAST, CT MAXILLOFACIAL WO CONTRAST. DATE: 3/7/2023 1:57 PM. INDICATION: Fall, left periorbital/zygomatic pain. COMPARISON: None TECHNIQUE: Axial CT imaging obtained of the head and face. Axial images and multiplanar reformatted images are provided for review. Up-to-date CT equipment and radiation dose reduction techniques were employed. CONTRAST: None. FINDINGS: HEAD: Small midline/ left anterior frontal scalp hematoma. No underlying calvarial fracture. No acute intracranial hemorrhage or mass effect. Mild atrophy. Mild patchy periventricular and subcortical white matter hypoattenuation. Gray-white differentiation intact. No acute calvarial abnormality. FACE: There is mild-to-moderate left infraorbital/upper cheek swelling. No underlying acute facial fracture. The normal, with prior cataract removal. The paranasal sinuses are grossly clear. There is multilevel cervical spondylosis. HEAD: Small midline/left anterior frontal scalp hematoma with no underlying calvarial fracture, intrarenal hemorrhage, or mass effect. Mild atrophy and chronic small vessel ischemic change. FACE: Mild-to-moderate left infraorbital/upper cheek swelling, with no underlying acute facial fracture. XR SHOULDER LEFT (MIN 2 VIEWS)    Result Date: 3/10/2023  EXAM: XR SHOULDER LEFT (MIN 2 VIEWS) INDICATION: Proximal humerus fracture f/u COMPARISON: 3/7/2023.  TECHNIQUE: 2 views of the left shoulder FINDINGS: Glenohumeral alignment appears intact. There is redemonstration of a displaced fracture at the left humeral neck. There is anterior displacement of the humeral diaphysis relative to the humeral head similar to prior radiograph. Mild AC joint arthrosis.     1.  Displaced left humeral neck fracture without significant change in alignment.     XR SHOULDER LEFT (MIN 2 VIEWS)    Result Date: 3/7/2023  EXAM: XR SHOULDER LEFT (MIN 2 VIEWS) HISTORY: fall, humeral head pain COMPARISON: None FINDINGS: Bones: Acute fracture of the left humeral neck. Joints: The joint space is well maintained. No effusion. No significant arthritic changes. Soft tissues: No significant soft tissue swelling or radiopaque foreign body.     1.  Acute minimally displaced fracture of the left humeral neck.    XR FINGER RIGHT (MIN 2 VIEWS)    Result Date: 3/7/2023  EXAM: XR FINGER RIGHT (MIN 2 VIEWS) HISTORY: R open thumb fracture and/or dislocation COMPARISON: None FINDINGS: Bones: Acute fracture of the base of the first digit distal phalanx. Joints: The joint space is well maintained. No effusion. Soft tissues: No significant soft tissue swelling or radiopaque foreign body.     1.  Acute fracture of the base of the first digit distal phalanx.    XR CHEST PORTABLE    Result Date: 3/7/2023  EXAM: XR CHEST PORTABLE INDICATION: fall COMPARISON: None FINDINGS: Medical Devices: None. Lungs: The lungs are clear. Pleura: Small bilateral pleural effusions are present. Heart and Mediastinum: The cardiomediastinal silhouette is within normal limits. Bones: Acute fracture of the left humeral neck is present. See shoulder radiograph for further details.     1.  Small bilateral pleural effusions. 2.  Acute fracture of the left humeral neck    CT MAXILLOFACIAL WO CONTRAST    Result Date: 3/7/2023  EXAM: CT HEAD WO CONTRAST, CT MAXILLOFACIAL WO CONTRAST. DATE: 3/7/2023 1:57 PM. INDICATION: Fall, left periorbital/zygomatic pain. COMPARISON: None TECHNIQUE:  Axial CT imaging obtained of the head and face. Axial images and multiplanar reformatted images are provided for review. Up-to-date CT equipment and radiation dose reduction techniques were employed. CONTRAST: None. FINDINGS: HEAD: Small midline/ left anterior frontal scalp hematoma. No underlying calvarial fracture. No acute intracranial hemorrhage or mass effect. Mild atrophy. Mild patchy periventricular and subcortical white matter hypoattenuation. Gray-white differentiation intact. No acute calvarial abnormality. FACE: There is mild-to-moderate left infraorbital/upper cheek swelling. No underlying acute facial fracture. The normal, with prior cataract removal. The paranasal sinuses are grossly clear. There is multilevel cervical spondylosis. HEAD: Small midline/left anterior frontal scalp hematoma with no underlying calvarial fracture, intrarenal hemorrhage, or mass effect. Mild atrophy and chronic small vessel ischemic change. FACE: Mild-to-moderate left infraorbital/upper cheek swelling, with no underlying acute facial fracture. Treatments: As above. Discharge Medications:     Medication List        START taking these medications      cefadroxil 500 MG capsule  Commonly known as: DURICEF  Take 1 capsule by mouth 2 times daily for 10 days     oxyCODONE 5 MG immediate release tablet  Commonly known as: Roxicodone  Take 1 tablet by mouth every 6 hours as needed for Pain for up to 3 days. Intended supply: 3 days. Take lowest dose possible to manage pain Max Daily Amount: 20 mg            CONTINUE taking these medications      aspirin 81 MG EC tablet  Take 1 tablet by mouth Twice a Week     atorvastatin 40 MG tablet  Commonly known as: LIPITOR     carvedilol 3.125 MG tablet  Commonly known as: COREG     cloNIDine 0.1 MG tablet  Commonly known as: CATAPRES     famotidine 20 MG tablet  Commonly known as: PEPCID     fluorometholone 0.1 % ophthalmic suspension  Commonly known as:  [facility] hydroCHLOROthiazide 25 MG tablet  Commonly known as: HYDRODIURIL     NIFEdipine 60 MG extended release tablet  Commonly known as: ADALAT CC     PARoxetine 20 MG tablet  Commonly known as: PAXIL     spironolactone 25 MG tablet  Commonly known as: ALDACTONE     Trelegy Ellipta 200-62.5-25 MCG/ACT Aepb inhaler  Generic drug: fluticasone-umeclidin-vilant     valsartan 320 MG tablet  Commonly known as: DIOVAN            STOP taking these medications      acetaminophen-codeine 300-30 MG per tablet  Commonly known as: TYLENOL/CODEINE #3               Where to Get Your Medications        You can get these medications from any pharmacy    Bring a paper prescription for each of these medications  aspirin 81 MG EC tablet  cefadroxil 500 MG capsule  oxyCODONE 5 MG immediate release tablet         Activity:PT/OT to work the left elbow wrist and hand maintain shoulder precautions limiting ROM of shoulder. Non weight bearing on the left arm  Diet: ADULT DIET; Regular; 1500 ml      Disposition: SNF  Discharged Condition: Stable  Follow Up:   [facility] [address] [place] [address]  [phone]              Code status:  Full Code         Total time spent on discharge, finalizing medications, referrals and arranging outpatient follow up was more than 30 minutes      Thank you Dr. Kian Mckeno, APRN - CNP for the opportunity to be involved in this patients care.

## 2023-03-13 NOTE — PROGRESS NOTES
Pt is alert & oriented x4; VSS on room air. Pain controlled with PRN pain medication. Ambulates as CGA with gait belt assistance. Voiding well and tolerating all PO. Performing ADLs better, such as brushing teeth and feeding herself. Discharging to [address] [facility] via EMS transportation this evening.

## 2023-03-22 ENCOUNTER — OFFICE VISIT (OUTPATIENT)
Dept: ORTHOPEDIC SURGERY | Age: 88
End: 2023-03-22

## 2023-03-22 VITALS — HEIGHT: 71 IN | WEIGHT: 176 LBS | BODY MASS INDEX: 24.64 KG/M2

## 2023-03-22 DIAGNOSIS — S62.511D OPEN DISPLACED FRACTURE OF PROXIMAL PHALANX OF RIGHT THUMB WITH ROUTINE HEALING, SUBSEQUENT ENCOUNTER: ICD-10-CM

## 2023-03-22 DIAGNOSIS — S42.202D CLOSED FRACTURE OF PROXIMAL END OF LEFT HUMERUS WITH ROUTINE HEALING, UNSPECIFIED FRACTURE MORPHOLOGY, SUBSEQUENT ENCOUNTER: ICD-10-CM

## 2023-03-22 DIAGNOSIS — M79.89 SWELLING OF LEFT HAND: Primary | ICD-10-CM

## 2023-03-22 NOTE — PROGRESS NOTES
proximal phalanx fracture, plate and screws in good position, no loosening. Xrays taken today in the office, 3 views of left shoulder were reviewed, and showed moderately displaced proximal humerus fracture. IMPRESSION:    1- Right thumb proximal phalanx IP joint fracture dislocation, status post I&D and OR. 2- Left proximal humerus fracture, non surgical.  3- Left hand swelling, new c/o    PLAN:  I have told the patient to work on ROM, as well as strengthening exercises. No heavy impact activities and NWB left shoulder. Ok to D/C sling. The patient will come back for a follow up in 6 weeks. At that time, we will take 3 views of the right hand and left shoulder. PT if needed then. As this patient has demonstrated risk factors for osteoporosis, such as age greater than [de-identified] years and evidence of a fracture, I have referred the patient back to the primary care physician for evaluation for osteoporosis, including consideration for DEXA scanning, if this is felt to be clinically indicated. The patient is advised to contact the primary care physician to follow-up for further evaluation. Procedures    Mary Vargas Titan Wrist and Thumb Brace     Patient was prescribed a Mary Vargas Titan Wrist and Thumb Brace. The right wrist and thumb will require stabilization / immobilization from this semi-rigid / rigid orthosis to improve their function. The orthosis will assist in protecting the affected area, provide functional support and facilitate healing. The patient was educated and fit by a healthcare professional with expert knowledge and specialization in brace application while under the direct supervision of the treating physician. Verbal and written instructions for the use of and application of this item were provided. They were instructed to contact the office immediately should the brace result in increased pain, decreased sensation, increased swelling or worsening of the condition.

## 2023-03-27 ENCOUNTER — TELEPHONE (OUTPATIENT)
Dept: ORTHOPEDIC SURGERY | Age: 88
End: 2023-03-27

## 2023-03-31 ENCOUNTER — TELEPHONE (OUTPATIENT)
Dept: ORTHOPEDIC SURGERY | Age: 88
End: 2023-03-31

## 2023-03-31 NOTE — TELEPHONE ENCOUNTER
Spoke with John Saldaña, Given Verbal orders. PLAN:  I have told the patient to work on ROM, as well as strengthening exercises. No heavy impact activities and NWB left shoulder. Ok to D/C sling. The patient will come back for a follow up in 6 weeks. At that time, we will take 3 views of the right hand and left shoulder. PT if needed then. patient may discontinue the brace until her swelling goes down to avoid further irritation of the incision. Patient should understand to take extra pre-cautions when not wearing the brace and we suggest using it once the swelling goes down.

## 2023-03-31 NOTE — TELEPHONE ENCOUNTER
[address] [place] Name: [facility]  Contact Name: Maurice Son Number: [phone]  Request or Information:     NEEDING VERBAL ORDERS FOR PT,OT, AND SKILLED NURSING

## 2023-04-04 ENCOUNTER — TELEPHONE (OUTPATIENT)
Dept: ORTHOPEDIC SURGERY | Age: 88
End: 2023-04-04

## 2023-04-04 NOTE — TELEPHONE ENCOUNTER
Salas Miller with [facility] is requesting an order for [address] Please call Salas Miller [phone] regarding this.

## 2023-05-02 ENCOUNTER — OFFICE VISIT (OUTPATIENT)
Dept: ORTHOPEDIC SURGERY | Age: 88
End: 2023-05-02

## 2023-05-02 DIAGNOSIS — S42.202D CLOSED FRACTURE OF PROXIMAL END OF LEFT HUMERUS WITH ROUTINE HEALING, UNSPECIFIED FRACTURE MORPHOLOGY, SUBSEQUENT ENCOUNTER: Primary | ICD-10-CM

## 2023-05-02 DIAGNOSIS — S62.511D OPEN DISPLACED FRACTURE OF PROXIMAL PHALANX OF RIGHT THUMB WITH ROUTINE HEALING, SUBSEQUENT ENCOUNTER: ICD-10-CM

## 2023-05-02 DIAGNOSIS — M79.89 SWELLING OF LEFT HAND: ICD-10-CM

## 2023-05-05 NOTE — PROGRESS NOTES
phalanx fracture, plate and screws in good position, no loosening. Xrays taken today in the office, 3 views of left shoulder were reviewed, and showed moderately displaced proximal humerus fracture. IMPRESSION:    1- Right thumb proximal phalanx IP joint fracture dislocation, status post I&D and OR. 2- Left proximal humerus fracture, non surgical.  3- Left hand swelling, much better. PLAN:  I have told the patient to work on ROM, as well as strengthening exercises. No heavy impact activities and WBAT left shoulder. The patient will come back for a follow up in 6 weeks. At that time, we will take 3 views of the right hand and left shoulder. PT if needed then. As this patient has demonstrated risk factors for osteoporosis, such as age greater than [de-identified] years and evidence of a fracture, I have referred the patient back to the primary care physician for evaluation for osteoporosis, including consideration for DEXA scanning, if this is felt to be clinically indicated. The patient is advised to contact the primary care physician to follow-up for further evaluation.        Del Obando MD

## 2023-05-19 ENCOUNTER — APPOINTMENT (OUTPATIENT)
Dept: PHYSICAL THERAPY | Age: 88
End: 2023-05-19
Payer: MEDICARE

## 2023-05-22 ENCOUNTER — HOSPITAL ENCOUNTER (OUTPATIENT)
Dept: PHYSICAL THERAPY | Age: 88
Setting detail: THERAPIES SERIES
Discharge: HOME | End: 2023-05-22
Payer: MEDICARE

## 2023-05-22 PROCEDURE — 97110 THERAPEUTIC EXERCISES: CPT

## 2023-05-22 PROCEDURE — 97161 PT EVAL LOW COMPLEX 20 MIN: CPT

## 2023-05-22 NOTE — FLOWSHEET NOTE
[]      []      []      []      []      []      []      []      []      Home Exercise Program:    Pendulum L shoulder all directions x 10    Therapeutic Exercise: per list x 24 min  [x] (96000) Provided verbal/tactile cueing for activities related to strengthening, flexibility, endurance, ROM for improvements in LE, proximal hip, and core control with self-care, mobility, lifting, ambulation. NMR:  [] (87749) Provided verbal/tactile cueing for activities related to improving balance, coordination, kinesthetic sense, posture, motor skill, proprioception to assist with LE, proximal hip, and core control in self-care, mobility, lifting, ambulation and eccentric single leg control. Therapeutic Activities:    [] (19019) Provided verbal/tactile cueing for activities related to improving balance, coordination, kinesthetic sense, posture, motor skill, proprioception and motor activation to allow for proper function of core, proximal hip and LE with self-care and ADLs and functional mobility. Gait Training:    [] (01270) Provided training and instruction to the patient for proper LE, core and proximal hip recruitment and positioning and eccentric body weight control with ambulation re-education including up and down stairs     Manual Treatments:  PROM / STM / Oscillations-Mobs:  G-I, II, III, IV (PA's, Inf., Post.)  [] (76818) Provided manual therapy to mobilize LE, proximal hip and/or LS spine soft tissue/joints for the purpose of modulating pain, promoting relaxation,  increasing ROM, reducing/eliminating soft tissue swelling/inflammation/restriction, improving soft tissue extensibility and allowing for proper ROM for normal function with self-care, mobility, lifting and ambulation.      Home Exercise Program:    [x] (02733) Reviewed/Progressed HEP activities related to strengthening, flexibility, endurance, ROM of core, proximal hip and LE for functional self-care, mobility, lifting and ambulation/stair

## 2023-05-26 ENCOUNTER — HOSPITAL ENCOUNTER (OUTPATIENT)
Dept: PHYSICAL THERAPY | Age: 88
Setting detail: THERAPIES SERIES
Discharge: HOME | End: 2023-05-26
Payer: MEDICARE

## 2023-05-26 PROCEDURE — 97140 MANUAL THERAPY 1/> REGIONS: CPT

## 2023-05-26 PROCEDURE — 97110 THERAPEUTIC EXERCISES: CPT

## 2023-05-26 NOTE — FLOWSHEET NOTE
[facility] Outpatient Therapy  [address] [place] [address], [address], [address]  Phone: [phone]   Fax: [phone]    Physical Therapy Treatment Note/ Progress Report:     Date:  2023     Patient Name:  Manjinder Vega    :  1935  MRN: [id number]    Medical Diagnosis Information:  Closed fracture of proximal end of left humerus with routine healing, unspecified fracture morphology, subsequent encounter [S4.D]  Open displaced fracture of proximal phalanx of right thumb with routine healing, subsequent encounter [S62.511D]  Treatment Diagnosis Information:  Treatment Diagnosis: S4.D closed fx proximal L humerus, S6.D open dispalced fx R thumb,R53.1 weakness, W19. Priscella Calamity  Insurance/Certification information:  PT Insurance Information: Darrell Galeano  Physician Information:  Kamari Fernández MD   Plan of care signed:    [] Yes  [x] No    Date of Patient follow up with Physician:      Progress Report: []  Yes  [x]  No   If Yes, Date Range for reporting period:  Beginnin23  Ending:      Progress report due (10 Rx/or 30 days whichever is less):      Recertification due (POC duration/ or 90 days whichever is less):      Visit # Insurance Allowable Auth Needed     []Yes   [x]No     RESTRICTIONS/PRECAUTIONS: high falls risk  Latex Allergy:  [x]NO      []YES  Preferred Language for Healthcare:   [x]English       []other:    Functional Scale: UEFS   Score: 23  Date assessed: 48/68 (12%)    Pain level:  0-1/10 L shoulder, R shoulder 3-4     SUBJECTIVE:  L shoulder feeling better and is starting to sleep a little on L shoulder but problem is R shoulder is hurting worse than L following this whole thing    OBJECTIVE: See eval  Observation: unsteady gait with cane, significant toe out sequence, tight heel cords and hamstrings with knee flexion in stance  rounded shoulder girdle  Test measurements:  bicep -32, shoulder flex 90,abd 90, ER 45(90) , IR 50

## 2023-06-02 ENCOUNTER — HOSPITAL ENCOUNTER (OUTPATIENT)
Dept: PHYSICAL THERAPY | Age: 88
Setting detail: THERAPIES SERIES
Discharge: HOME | End: 2023-06-02
Payer: MEDICARE

## 2023-06-02 PROCEDURE — 97110 THERAPEUTIC EXERCISES: CPT

## 2023-06-02 PROCEDURE — 97140 MANUAL THERAPY 1/> REGIONS: CPT

## 2023-06-02 NOTE — FLOWSHEET NOTE
[facility] Outpatient [facility]  [address] [place] [address], [address], [address]  Phone: [phone]   Fax: [phone]    Physical Therapy Treatment Note/ Progress Report:     Date:  2023     Patient Name:  Bobby Locke    :  1935  MRN: [id number]    Medical Diagnosis Information:  Closed fracture of proximal end of left humerus with routine healing, unspecified fracture morphology, subsequent encounter [S4.D]  Open displaced fracture of proximal phalanx of right thumb with routine healing, subsequent encounter [S62.511D]  Treatment Diagnosis Information:  Treatment Diagnosis: S4D closed fx proximal L humerus, S6.D open dispalced fx R thumb,R53.1 weakness, W19. Jose Cancel  Insurance/Certification information:  PT Insurance Information: Jorge Nieves  Physician Information:  Keyla Kaur MD   Plan of care signed:    [x] Yes  [] No    Date of Patient follow up with Physician:      Progress Report: []  Yes  [x]  No   If Yes, Date Range for reporting period:  Beginnin23  Ending:      Progress report due (10 Rx/or 30 days whichever is less): 54     Recertification due (POC duration/ or 90 days whichever is less):      Visit # Insurance Allowable Auth Needed   3/16  []Yes   [x]No     RESTRICTIONS/PRECAUTIONS: high falls risk  Latex Allergy:  [x]NO      []YES  Preferred Language for Healthcare:   [x]English       []other:    Functional Scale: UEFS   Score: 23  Date assessed: 48 (12%)    Pain level:  0/10 L shoulder (Rest), 3/10 (activity)    SUBJECTIVE:  did well following last PT visit, R shoulder improved with putting pillow under it when sleeping    OBJECTIVE: See eval  Observation: unsteady gait with cane, significant toe out sequence, tight heel cords and hamstrings with knee flexion in stance  rounded shoulder girdle  Test measurements:  bicep -32, (P)shoulder flex 90,abd 90, ER 45(90) , IR 50 (90)  Strength 3- compensated

## 2023-06-06 ENCOUNTER — HOSPITAL ENCOUNTER (OUTPATIENT)
Dept: PHYSICAL THERAPY | Age: 88
Setting detail: THERAPIES SERIES
Discharge: HOME | End: 2023-06-06
Payer: MEDICARE

## 2023-06-06 PROCEDURE — 97140 MANUAL THERAPY 1/> REGIONS: CPT

## 2023-06-06 PROCEDURE — 97164 PT RE-EVAL EST PLAN CARE: CPT

## 2023-06-06 PROCEDURE — 97035 APP MDLTY 1+ULTRASOUND EA 15: CPT

## 2023-06-06 NOTE — PLAN OF CARE
[facility] Outpatient Therapy  [address] [place] [address], [address], [address]  Phone: [phone]   Fax: [phone]    Physical Therapy Treatment Note/ Progress Report:     Date:  2023     Patient Name:  Yuval Campos    :  1935  MRN: [id number]    Medical Diagnosis Information:  M75.21 Right Biceps tendonitis  M25.511 Acute R shoulder pain  Treatment Diagnosis Information:  Treatment Diagnosis: M75.21 Right Biceps tendonitis  M25.511 Acute R shoulder pain, M62.81 generalized muscle weakness  Insurance/Certification information:  PT Insurance Information: Tammy Larsen [address]  Physician Information: Shawn Starkey NP   Plan of care signed:    [] Yes  [x] No    Date of Patient follow up with Physician:      Progress Report: []  Yes  [x]  No   If Yes, Date Range for reporting period:  Beginnin23  Ending:      Progress report due (10 Rx/or 30 days whichever is less): 3/6/27     Recertification due (POC duration/ or 90 days whichever is less):      Visit # Insurance Allowable Auth Needed     []Yes   [x]No     RESTRICTIONS/PRECAUTIONS: high falls risk  Latex Allergy:  [x]NO      []YES  Preferred Language for Healthcare:   [x]English       []other:    Functional Scale: UEFS   Score: 23  Date assessed: 48/68 (12%)    Pain level:  6/10 R shoulder     SUBJECTIVE:  pt presents to therapy for L shoulder treatment today with new script to include R UE. R shoulder has been worsening due to overuse as pt was recovering from L shoulder ORIF with lengthy immobilization. Pt was evaluated 23 for L shoulder with treatment now to include R shoulder. R shoulder pain is intermittent and chronic over the years but worsened over the past several months since being unable to use L UE. Pain interferes with sleeping,reaching,lifting, grooming, and OH activity. Pt is R hand dominant.  Reports increased R shoulder pain following use of pulleys last treatment for L shoulder

## 2023-06-09 ENCOUNTER — HOSPITAL ENCOUNTER (OUTPATIENT)
Dept: PHYSICAL THERAPY | Age: 88
Setting detail: THERAPIES SERIES
Discharge: HOME | End: 2023-06-09
Payer: MEDICARE

## 2023-06-09 PROCEDURE — 97035 APP MDLTY 1+ULTRASOUND EA 15: CPT

## 2023-06-09 PROCEDURE — 97140 MANUAL THERAPY 1/> REGIONS: CPT

## 2023-06-09 NOTE — FLOWSHEET NOTE
[] Gait Training (40761) x       [] ES(attended) (61373)  [] ES (un) (74180)   [] DRY NEEDLE 1 OR 2 MUSCLES  [] DRY NEEDLE 3+ MUSCLES  [] [facility] Traction (59724)  [x] Ultrasound (27502)  [] Other:        GOALS:  Patient stated goal: \"decrease pain R shoulder\"  [] Progressing: [] Met: [] Not Met: [] Adjusted     Therapist goals for Patient:   Short Term Goals: To be achieved in: 3 weeks  1. Independent in HEP and progression per patient tolerance, in order to prevent re-injury. [] Progressing: [] Met: [] Not Met: [] Adjusted  2. Patient will demonstrate improved elbow extension to -10 to improve functional mobility with dressing and low reaching  [] Progressing: [] Met: [] Not Met: [] Adjusted     Long Term Goals: To be achieved in: 8 weeks  1. Disability index score of 58/68 for the UEFS to assist with reaching prior level of function. [] Progressing: [] Met: [] Not Met: [] Adjusted  2. Patient will demonstrate increased  shoulder AROM to [facility] to enable pt to resume I ADLs and [place] activity with reaching cabinets in the home and washing hair   [] Progressing: [] Met: [] Not Met: [] Adjusted  3. Patient will demonstrate an increase in NM recruitment/activation and overall GH and scapular strength to >/= 3+/5 to enable pt to reach,lift and reach [place] without limitation from pain  [] Progressing: [] Met: [] Not Met: [] Adjusted  4. Patient will return to sleeping without increased symptoms or restriction. [] Progressing: [] Met: [] Not Met: [] Adjusted  5. Pt will resume I ADLs,IADLs as prior to recent fall             [] Progressing: [] Met: [] Not Met: [] Adjusted       ASSESSMENT:  Pt is  80 Y. O  female, presenting with c/o worsening R shoulder pain most likely due to overuse as pt had been immobilized in a sling for a L shoulder fx following a fall in March. Pt received a new scipt for evaluation of R shoulder to be included in the current treatment of L shoulder.  Pt presents with postural deficits, limited ROM

## 2023-06-19 ENCOUNTER — HOSPITAL ENCOUNTER (OUTPATIENT)
Dept: PHYSICAL THERAPY | Age: 88
Setting detail: THERAPIES SERIES
Discharge: HOME | End: 2023-06-19
Payer: MEDICARE

## 2023-06-19 PROCEDURE — 97110 THERAPEUTIC EXERCISES: CPT | Performed by: PHYSICAL THERAPIST

## 2023-06-19 PROCEDURE — G0283 ELEC STIM OTHER THAN WOUND: HCPCS | Performed by: PHYSICAL THERAPIST

## 2023-06-19 PROCEDURE — 97140 MANUAL THERAPY 1/> REGIONS: CPT | Performed by: PHYSICAL THERAPIST

## 2023-06-21 ENCOUNTER — HOSPITAL ENCOUNTER (OUTPATIENT)
Dept: PHYSICAL THERAPY | Age: 88
Setting detail: THERAPIES SERIES
Discharge: HOME | End: 2023-06-21
Payer: MEDICARE

## 2023-06-21 NOTE — CARE COORDINATION
[facility] [place] [facility]  [address]  [facility] [place] [address] Krishna Edwards [address], [address]  Phone: [phone]   Fax: [phone]    Physical Therapy Missed Visit Note     Date:  2023    Patient Name:  Alfonso Mayfield      :  1935    MRN: [id number]      Cancelled visits to date: 1  No-shows to date: 0    For today's appointment patient:  [x]  Cancelled  []  Rescheduled appointment  []  No-show     Reason given by patient:  []  Patient ill  []  Conflicting appointment  []  No transportation    []  Conflict with work  [x]  No reason given  []  Other:     Comments:      Electronically signed by:  Mary Mo PT, PT

## 2023-06-28 ENCOUNTER — HOSPITAL ENCOUNTER (OUTPATIENT)
Dept: PHYSICAL THERAPY | Age: 88
Setting detail: THERAPIES SERIES
Discharge: HOME | End: 2023-06-28
Payer: MEDICARE

## 2023-06-28 PROCEDURE — G0283 ELEC STIM OTHER THAN WOUND: HCPCS

## 2023-06-28 PROCEDURE — 97140 MANUAL THERAPY 1/> REGIONS: CPT

## 2023-06-28 PROCEDURE — 97110 THERAPEUTIC EXERCISES: CPT

## 2023-06-30 ENCOUNTER — HOSPITAL ENCOUNTER (OUTPATIENT)
Dept: PHYSICAL THERAPY | Age: 88
Setting detail: THERAPIES SERIES
Discharge: HOME | End: 2023-06-30
Payer: MEDICARE

## 2023-06-30 PROCEDURE — 97140 MANUAL THERAPY 1/> REGIONS: CPT

## 2023-06-30 PROCEDURE — 97110 THERAPEUTIC EXERCISES: CPT

## 2023-07-03 ENCOUNTER — HOSPITAL ENCOUNTER (OUTPATIENT)
Dept: PHYSICAL THERAPY | Age: 88
Setting detail: THERAPIES SERIES
Discharge: HOME | End: 2023-07-03
Payer: MEDICARE

## 2023-07-03 PROCEDURE — 97140 MANUAL THERAPY 1/> REGIONS: CPT

## 2023-07-03 PROCEDURE — 97110 THERAPEUTIC EXERCISES: CPT

## 2023-07-03 NOTE — FLOWSHEET NOTE
[facility]  [address] [place] [address], [id number] [place] [address], [address]  Phone: [phone]   Fax: [phone]    Physical Therapy Treatment Note/ Progress Report:     Date:  2023     Patient Name:  Fantasma Aponte    :  1935  MRN: [id number]    Medical Diagnosis Information:  Diagnosis: S42.202D closed fx proximal L humerus, S62.511D open dispalced fx R thumb   Treatment Diagnosis: S42.202D closed fx proximal L humerus, S62.511D open dispalced fx R thumb,R53.1 weakness, W19. Wynell Lager                                           Insurance/Certification information:  PT Insurance Information: Yaneth Pennington  Physician Information:   Dr Gigi Najjar of care signed:    [x] Yes  [] No    Date of Patient follow up with Physician:      Progress Report: []  Yes  [x]  No   If Yes, Date Range for reporting period:  Beginnin23  Endin23 (3 visits)    Progress report due (10 Rx/or 30 days whichever is less): 6/3/47     Recertification due (POC duration/ or 90 days whichever is less):      Visit # Insurance Allowable Auth Needed      By Medical Necessity []Yes   [x]No     RESTRICTIONS/PRECAUTIONS: high falls risk  Latex Allergy:  [x]NO      []YES  Preferred Language for Healthcare:   [x]English       []other:    Functional Scale: 23 UEFS (LUE) 48/68 (12%)      Pain level: 0/10 R shoulder at rest,  0/10 L shoulder at rest    SUBJECTIVE:  reports no increased pain or soreness following last therapy visit, sleeping well, feels unsteady with walking with cane, does have a RW at home      OBJECTIVE:   Observation: unsteady gait with cane, maintains knee flexion in stance, decreased step length/step through sequence with poor heel strike/toe off  Test measurements:  L shoulder pain 0-3, (P) flex 86, abd 65, ER (90) 50, IR 45, elbow ext -32  L shoulder strength: 2+/3-, poor scap stabilization, poor RTC, compensated movement  R shoulder pain

## 2023-07-07 ENCOUNTER — HOSPITAL ENCOUNTER (OUTPATIENT)
Dept: PHYSICAL THERAPY | Age: 88
Setting detail: THERAPIES SERIES
Discharge: HOME | End: 2023-07-07
Payer: MEDICARE

## 2023-07-07 PROCEDURE — 97110 THERAPEUTIC EXERCISES: CPT

## 2023-07-07 PROCEDURE — 97140 MANUAL THERAPY 1/> REGIONS: CPT

## 2023-07-07 NOTE — FLOWSHEET NOTE
[facility] [place] [facility] Out[facility]  [address] [place] [address], [address] [place] [address], [address] [place] [address] Avenue  Phone: [phone]   Fax: [phone]    Physical Therapy Treatment Note/ Progress Report:     Date:  2023     Patient Name:  Justine Ruiz    :  1935  MRN: [id number]    Medical Diagnosis Information:  Diagnosis: S42.202D closed fx proximal L humerus, S62.511D open dispalced fx R thumb   Treatment Diagnosis: S42.202D closed fx proximal L humerus, S62.511D open dispalced fx R thumb,R53.1 weakness, W19. Harlo Kitchen                                           Insurance/Certification information:  PT Insurance Information: Pat Ferrer  Physician Information:   Dr Lg Richardson of care signed:    [x] Yes  [] No    Date of Patient follow up with Physician:      Progress Report: []  Yes  [x]  No   If Yes, Date Range for reporting period:  Beginnin23  Endin23 (3 visits)    Progress report due (10 Rx/or 30 days whichever is less): 3/3/17     Recertification due (POC duration/ or 90 days whichever is less):      Visit # Insurance Allowable Auth Needed      By Medical Necessity []Yes   [x]No     RESTRICTIONS/PRECAUTIONS: high falls risk  Latex Allergy:  [x]NO      []YES  Preferred Language for Healthcare:   [x]English       []other:    Functional Scale: 23 UEFS (LUE) 48/68 (12%)      Pain level:  0/10 L shoulder at rest    SUBJECTIVE:  has been able to sleep on both sides without difficulty and feels R shoulder is back to baseline, hasn't gotten walker out at home yet but plans to for community use     OBJECTIVE:   Observation: unsteady gait with cane, improved heel/toe components when intentional, lacks full TKE  Test measurements:  L shoulder ROM in supine (AA) flex initially 86 and improved to 127, abd 65 improved to 100, ER (90) 50 improved to 78, IR (90) 45 improved to 80   L shoulder strength: 2+/3-, poor scap stabilization, poor RTC, compensated movement but tolerating TE

## 2023-07-10 ENCOUNTER — HOSPITAL ENCOUNTER (OUTPATIENT)
Dept: PHYSICAL THERAPY | Age: 88
Setting detail: THERAPIES SERIES
Discharge: HOME | End: 2023-07-10
Payer: MEDICARE

## 2023-07-10 PROCEDURE — 97110 THERAPEUTIC EXERCISES: CPT | Performed by: PHYSICAL THERAPIST

## 2023-07-10 NOTE — FLOWSHEET NOTE
technique. Recommend continued skilled PT to progress shoulder ROM,strength, and overall joint mechanics to restore functional mobility as prior to surgery. Treatment/Activity Tolerance:  [x] Patient tolerated treatment well [] Patient limited by fatique  [] Patient limited by pain  [] Patient limited by other medical complications  [] Other:     Overall Progression Towards Functional goals/ Treatment Progress Update:  [] Patient is progressing as expected towards functional goals listed. [x] Progression is slowed due to complexities/Impairments listed. [] Progression has been slowed due to co-morbidities. [] Plan just implemented, too soon to assess goals progression <30days   [] Goals require adjustment due to lack of progress  [] Patient is not progressing as expected and requires additional follow up with physician  [] Other    Prognosis for POC: [x] Good [] Fair  [] Poor    Patient requires continued skilled intervention: [x] Yes  [] No        PLAN:   Interventions:  [x]  Therapeutic exercise including: strength training, ROM, for scapula, core and Upper extremity, including postural re-education. [x]  NMR activation and proprioception for UE, periscapular and RC muscles and Core, including postural re-education. [x]  Manual therapy as indicated for shoulder, scapula, spine and associated soft tissue including: Dry Needling/IASTM, STM, PROM, Gr I-IV mobilizations, manipulation. [x] Modalities as needed that may include: thermal agents, E-stim, Biofeedback, US, iontophoresis as indicated  [x] Patient education on joint protection, postural re-education, activity modification, progression of HEP.      [x] Continue per plan of care [] Alter current plan (see comments)  [] Plan of care initiated [] Hold pending MD visit [] Discharge    Electronically signed by: Norberto Hargrove, PT    Note: If patient does not return for scheduled/recommended follow up visits, this note will serve as a

## 2023-07-12 ENCOUNTER — APPOINTMENT (OUTPATIENT)
Dept: PHYSICAL THERAPY | Age: 88
End: 2023-07-12
Payer: MEDICARE

## 2023-07-14 ENCOUNTER — HOSPITAL ENCOUNTER (OUTPATIENT)
Dept: PHYSICAL THERAPY | Age: 88
Setting detail: THERAPIES SERIES
Discharge: HOME | End: 2023-07-14
Payer: MEDICARE

## 2023-07-14 PROCEDURE — 97110 THERAPEUTIC EXERCISES: CPT

## 2023-07-14 PROCEDURE — 97140 MANUAL THERAPY 1/> REGIONS: CPT

## 2023-07-14 NOTE — THERAPY RECERTIFICATION
bold performed in department today. Items not bolded are carried forward from prior visits for continuity of the record. Exercise/Equipment Resistance/Repetitions HEP Other comments     []    2x10 []    Pendulum LUE 2# 5 all directions [x]      []      []    Doorway pec stretch 30 sec x 3 []    PROM all directions/stretching L shoulder ER/IR, bicep stretch, GH distraction mobs/inf glides manual []    Supine cane flexion  10 x as tolerated [x]    pulleys 2 x 10 flex  1 x 10 abd [x]    NUSTEP X 3 min []    Table slides 10 x standing [x]    AROM Shoulder ER Yellow tband x 20 [x]    rows Yellow tband x 20 [x]    B shoulder extensions Yellow tband x 10 [x]      []      []      []      []      Access Code: [email or web address]  URL: [email or web address]  Date: 06/30/2023  Prepared by: Niles Sumner    Exercises  - Seated Scapular Retraction  - 1 x daily - 7 x weekly - 2 sets - 10 reps  - Horizontal Shoulder Pendulum with Table Support  - 1 x daily - 7 x weekly - 1 sets - 10 reps  - Corner Pec Major Stretch  - 1 x daily - 7 x weekly - 1 sets - 3 reps - 30 hold  - Shoulder External Rotation and Scapular Retraction with Resistance  - 1 x daily - 7 x weekly - 1 sets - 20 reps  - Wall Clock  - 1 x daily - 7 x weekly - 1 sets - 3 reps  - Supine Shoulder Flexion AAROM with Dowel  - 1 x daily - 7 x weekly - 2 sets - 10 reps  - Seated Shoulder Flexion Towel Slide at Table Top  - 1 x daily - 7 x weekly - 1 sets - 3 reps - 30 hold      Therapeutic Exercise: per list above x 26 min  [x] (25767) Provided verbal/tactile cueing for activities related to strengthening, flexibility, endurance, ROM for improvements in LE, proximal hip, and core control with self-care, mobility, lifting, ambulation.     NMR:  [] (76298) Provided verbal/tactile cueing for activities related to improving balance, coordination, kinesthetic sense, posture, motor skill, proprioception to assist with LE, proximal hip, and core control in self-care, mobility,

## 2023-07-18 ENCOUNTER — HOSPITAL ENCOUNTER (OUTPATIENT)
Dept: PHYSICAL THERAPY | Age: 88
Setting detail: THERAPIES SERIES
Discharge: HOME | End: 2023-07-18
Payer: MEDICARE

## 2023-07-18 PROCEDURE — 97140 MANUAL THERAPY 1/> REGIONS: CPT

## 2023-07-18 PROCEDURE — 97110 THERAPEUTIC EXERCISES: CPT

## 2023-07-18 NOTE — FLOWSHEET NOTE
[facility] Out[facility]  [address] [facility] [address], [address] [facility], [address] [place] [address] Avenue  Phone: [phone]   Fax: [phone]    Physical Therapy Treatment Note/ Progress Report:     Date:  2023     Patient Name:  Christi Mehta    :  1935  MRN: [id number]    Medical Diagnosis Information:  Diagnosis: S42.202D closed fx proximal L humerus, S62.511D open dispalced fx R thumb   Treatment Diagnosis: S42.202D closed fx proximal L humerus, S62.511D open dispalced fx R thumb,R53.1 weakness, W19. [place] Radha                                           Insurance/Certification information:  PT Insurance Information: [facility] [address]  Physician Information:   Dr Elly Lee of [facility] signed:    [x] Yes  [] No    Date of Patient follow up with Physician:      Progress Report: [x]  Yes  []  No   If Yes, Date Range for reporting period:  Beginnin23  Endin23 (14 visits)    Progress report due (10 Rx/or 30 days whichever is less): 9175    Recertification due (POC duration/ or 90 days whichever is less):      Visit # Insurance Allowable Auth Needed   15/16   By Medical Necessity []Yes   [x]No     RESTRICTIONS/PRECAUTIONS: high falls risk  Latex Allergy:  [x]NO      []YES  Preferred Language for Healthcare:   [x]English       []other:    Functional Scale: 23 UEFS (LUE) 48/68 (12%)      Pain level:  0/10 L shoulder at rest    SUBJECTIVE:  reports no increased soreness following last visit, c/o stiffness in L shoulder with difficulty raising arm to wash/comb hair    OBJECTIVE:   Observation: unsteady gait with cane, recommended FWW  Test measurements:  L shoulder ROM in supine (AA) flex initially 86 and improved to 120, abd 65 improved to 100, ER (90) 50 improved to 78, IR (90) 45 improved to 80   Active shoulder in standing 90 (can hold 95)  L shoulder strength: 2+/3-, poor scap stabilization, poor RTC, compensated movement but tolerating TE well without increased

## 2023-07-31 ENCOUNTER — HOSPITAL ENCOUNTER (OUTPATIENT)
Dept: PHYSICAL THERAPY | Age: 88
Setting detail: THERAPIES SERIES
Discharge: HOME | End: 2023-07-31
Payer: MEDICARE

## 2023-07-31 PROCEDURE — 97140 MANUAL THERAPY 1/> REGIONS: CPT

## 2023-07-31 PROCEDURE — 97110 THERAPEUTIC EXERCISES: CPT

## 2023-07-31 NOTE — FLOWSHEET NOTE
movement but tolerating TE well without increased pain    Exercises/Interventions: Exercises in bold performed in department today. Items not bolded are carried forward from prior visits for continuity of the record. Exercise/Equipment Resistance/Repetitions HEP Other comments     []    2x10 []    Pendulum LUE 2# 5 all directions [x]      []      []    doorway pec stretch 30 sec x 3 []    PROM all directions/stretching L shoulder ER/IR, bicep stretch, GH distraction mobs/inf glides manual []       []    pulleys 2 x 10 flex  1 x 10 abd [x]    NUSTEP X 5 min []    Wall slides 10 x standing [x]    Wall ball circles 10 ea direction     Active Shoulder ER Yellow tband x 20 [x]    rows Yellow tband x 20 [x]    B shoulder extensions Yellow tband x 10 [x]      []    Wall clocks x3 []      []      []      Access Code: [email or web address]  URL: [email or web address]  Date: 07/18/2023  Prepared by: Melanie Espinosa    Exercises  - Seated Scapular Retraction  - 1 x daily - 7 x weekly - 2 sets - 10 reps  - Horizontal Shoulder Pendulum with Table Support  - 1 x daily - 7 x weekly - 1 sets - 10 reps  - Corner Pec Major Stretch  - 1 x daily - 7 x weekly - 1 sets - 3 reps - 30 hold  - Shoulder External Rotation and Scapular Retraction with Resistance  - 1 x daily - 7 x weekly - 1 sets - 20 reps  - Shoulder Flexion Wall Slide with Towel  - 1 x daily - 7 x weekly - 3 sets - 10 reps  - Wall Clock  - 1 x daily - 7 x weekly - 1 sets - 3 reps  - Supine Shoulder Flexion AAROM with Dowel  - 1 x daily - 7 x weekly - 2 sets - 10 reps  - Standing Row with Anchored Resistance  - 1 x daily - 7 x weekly - 3 sets - 10 reps  - Shoulder extension with resistance - Neutral  - 1 x daily - 7 x weekly - 1 sets - 10 reps    Therapeutic Exercise: per list above x 30min  [x] (48229) Provided verbal/tactile cueing for activities related to strengthening, flexibility, endurance, ROM for improvements in LE, proximal hip, and core control with self-care,

## 2023-08-04 ENCOUNTER — HOSPITAL ENCOUNTER (OUTPATIENT)
Dept: PHYSICAL THERAPY | Age: 88
Setting detail: THERAPIES SERIES
Discharge: HOME | End: 2023-08-04
Payer: MEDICARE

## 2023-08-04 NOTE — CARE COORDINATION
[facility] Outpatient Therapy  [address] [facility] [address], [address] [facility], [address] [place] [address] Avenue  Phone: [phone]   Fax: [phone]    Physical Therapy Missed Visit Note     Date:  2023    Patient Name:  Mike Dobbins      :  1935    MRN: [id number]      Cancelled visits to date: 0  No-shows to date: 0    For today's appointment patient:  [x]  Cancelled  []  Rescheduled appointment  []  No-show     Reason given by patient:  []  Patient ill  []  Conflicting appointment  []  No transportation    []  Conflict with work  []  No reason given  []  Other:     Comments:  BP high    Electronically signed by:  Thien Vaughn PT

## 2023-08-07 ENCOUNTER — HOSPITAL ENCOUNTER (OUTPATIENT)
Dept: PHYSICAL THERAPY | Age: 88
Setting detail: THERAPIES SERIES
Discharge: HOME | End: 2023-08-07
Payer: MEDICARE

## 2023-08-07 PROCEDURE — 97140 MANUAL THERAPY 1/> REGIONS: CPT

## 2023-08-07 PROCEDURE — 97110 THERAPEUTIC EXERCISES: CPT

## 2023-08-07 NOTE — FLOWSHEET NOTE
[facility]  [address] [facility] [address], [address] [facility], [address] [place] [address] Avenue  Phone: [phone]   Fax: [phone]    Physical Therapy Treatment Note/ Progress Report:     Date:  2023     Patient Name:  Quentin De León    :  1935  MRN: [id number]    Medical Diagnosis Information:  Diagnosis: S42.202D closed fx proximal L humerus, S62.511D open dispalced fx R thumb   Treatment Diagnosis: S42.202D closed fx proximal L humerus, S62.511D open dispalced fx R thumb,R53.1 weakness, W19. Jaden Boys                                           Insurance/Certification information:  PT Insurance Information: Angeles Brunson  Physician Information:   Dr Prentice Merlin of care signed:    [x] Yes  [] No    Date of Patient follow up with Physician:      Progress Report: []  Yes  [x]  No   If Yes, Date Range for reporting period:  Beginnin23  Endin23 (14 visits)    Progress report due (10 Rx/or 30 days whichever is less):     Recertification due (POC duration/ or 90 days whichever is less):      Visit # Insurance Allowable Auth Needed    ,   By Medical Necessity []Yes   [x]No     RESTRICTIONS/PRECAUTIONS: high falls risk  Latex Allergy:  [x]NO      []YES  Preferred Language for Healthcare:   [x]English       []other:    Functional Scale: 23 UEFS (LUE) 48/68 (12%)  Functional Scale: 23 UEFS: 73/80 (91% functional)      Pain level:  0/10 L shoulder at rest    SUBJECTIVE:  pt feeling better with BP medication finally regulated, pt monitoring BP at home. Reports washing hair is getting easier and can reach her back easier.  Reports no pain except when going several days without doing exercises    OBJECTIVE:   Observation: unsteady gait with cane, recommended FWW  Test measurements:  L shoulder ROM in supine (AA) flex 120, abd 100, ER 85 (90 abd) , IR 80 (90 abd)   Active shoulder flexion in standing 90 (AA) can hold 100 degrees  L shoulder strength:

## 2023-08-09 ENCOUNTER — HOSPITAL ENCOUNTER (OUTPATIENT)
Dept: PHYSICAL THERAPY | Age: 88
Setting detail: THERAPIES SERIES
Discharge: HOME | End: 2023-08-09
Payer: MEDICARE

## 2023-08-09 PROCEDURE — 97110 THERAPEUTIC EXERCISES: CPT

## 2023-08-09 PROCEDURE — 97140 MANUAL THERAPY 1/> REGIONS: CPT

## 2023-08-09 NOTE — FLOWSHEET NOTE
[facility] Out[facility]  [address] [facility] [address], [address] [facility], [address] [place] [address] Avenue  Phone: [phone]   Fax: [phone]    Physical Therapy Treatment Note/ Progress Report:     Date:  2023     Patient Name:  Marko Juan    :  1935  MRN: [id number]    Medical Diagnosis Information:  Diagnosis: S42.202D closed fx proximal L humerus, S62.511D open dispalced fx R thumb   Treatment Diagnosis: S42.202D closed fx proximal L humerus, S62.511D open dispalced fx R thumb,R53.1 weakness, W19. Jaren Mathis                                           Insurance/Certification information:  PT Insurance Information: [facility] [address]  Physician Information:   Dr Manuela Go of care signed:    [x] Yes  [] No    Date of Patient follow up with Physician:      Progress Report: []  Yes  [x]  No   If Yes, Date Range for reporting period:  Beginnin23  Endin23 (14 visits)    Progress report due (10 Rx/or 30 days whichever is less):     Recertification due (POC duration/ or 90 days whichever is less):      Visit # Insurance Allowable Auth Needed    ,   By Medical Necessity []Yes   [x]No     RESTRICTIONS/PRECAUTIONS: high falls risk  Latex Allergy:  [x]NO      []YES  Preferred Language for Healthcare:   [x]English       []other:    Functional Scale: 23 UEFS (LUE) 48/68 (12%)  Functional Scale: 23 UEFS: 73/80 (91% functional)      Pain level:  0/10 L shoulder at rest    SUBJECTIVE: c/o stiffness and stated \"I can't believe how quickly it tightens back up again after use\"    OBJECTIVE:   Observation: unsteady gait with cane, recommended FWW  Test measurements:  L shoulder ROM in supine (AA) flex 120, abd 100, ER 85 (90 abd) , IR 80 (90 abd)   Active shoulder flexion in standing 90 (AA) can hold 100 degrees  L shoulder strength: 2+/3-, poor scap stabilization, poor RTC, compensated movement but tolerating TE well without increased pain    Exercises/Interventions:

## 2023-08-14 ENCOUNTER — HOSPITAL ENCOUNTER (OUTPATIENT)
Dept: PHYSICAL THERAPY | Age: 88
Setting detail: THERAPIES SERIES
Discharge: HOME | End: 2023-08-14
Payer: MEDICARE

## 2023-08-14 PROCEDURE — 97750 PHYSICAL PERFORMANCE TEST: CPT

## 2023-08-14 PROCEDURE — 97110 THERAPEUTIC EXERCISES: CPT

## 2023-08-14 PROCEDURE — 97140 MANUAL THERAPY 1/> REGIONS: CPT

## 2023-08-14 NOTE — PROGRESS NOTES
[facility] Out[facility]  [address] [facility] [address], [address] [facility], [address] [place] [address] Avenue  Phone: [phone]   Fax: [phone]    Physical Therapy Treatment Note/ Progress Report:     Date:  2023     Patient Name:  Angie Rodriguez    :  1935  MRN: [id number]    Medical Diagnosis Information:  Diagnosis: S42.202D closed fx proximal L humerus, S62.511D open dispalced fx R thumb   Treatment Diagnosis: S42.202D closed fx proximal L humerus, S62.511D open dispalced fx R thumb,R53.1 weakness, W19. Wilhemenia Outlaw                                           Insurance/Certification information:  PT Insurance Information: Briana Truong  Physician Information:   Dr Kaila Braxton of [facility] signed:    [x] Yes  [] No    Date of Patient follow up with Physician:      Progress Report: [x]  Yes  []  No   If Yes, Date Range for reporting period:  Beginnin  Endin23 (5 visits)    Progress report due (10 Rx/or 30 days whichever is less): 3/35/0613    Recertification due (POC duration/ or 90 days whichever is less):      Visit # Insurance Allowable Auth Needed    ,   By Medical Necessity []Yes   [x]No     RESTRICTIONS/PRECAUTIONS: high falls risk  Latex Allergy:  [x]NO      []YES  Preferred Language for Healthcare:   [x]English       []other:    Functional Scale: 23 UEFS  48/68 (67%)  Functional Scale: 23 UEFS: 73/80 (91%)  Functional Scale: 23 UEFS: 78/80 (98%)      Pain level:  0/10 L shoulder at rest    SUBJECTIVE: c/o stiffness only, denies pain, has resumed I ADLs and IADLs continued but continues with difficulty lifting (especially lifting dishes overhead into cabinet)    OBJECTIVE:   Observation: cane ambulation, toe out sequence, flexed hips and knees  Test measurements:  L shoulder ROM in supine (AA) flex increased from 120 to 130 degrees, abd  increased from 100 to 120 degrees, ER 85 (90 abd) with no change , IR 80 (90 abd) with no change  Active shoulder

## 2023-08-16 ENCOUNTER — HOSPITAL ENCOUNTER (OUTPATIENT)
Dept: PHYSICAL THERAPY | Age: 88
Setting detail: THERAPIES SERIES
Discharge: HOME | End: 2023-08-16
Payer: MEDICARE

## 2023-08-16 PROCEDURE — 97110 THERAPEUTIC EXERCISES: CPT

## 2023-08-16 PROCEDURE — 97140 MANUAL THERAPY 1/> REGIONS: CPT

## 2023-08-16 NOTE — FLOWSHEET NOTE
[facility] Outpatient Therapy  [address] [facility] [address], [address] [facility], [address] [place] [address] Avenue  Phone: [phone]   Fax: [phone]    Physical Therapy Treatment Note/ Progress Report:     Date:  2023     Patient Name:  Judge Esparza    :  1935  MRN: [id number]    Medical Diagnosis Information:  Diagnosis: S42.202D closed fx proximal L humerus, S62.511D open dispalced fx R thumb   Treatment Diagnosis: S42.202D closed fx proximal L humerus, S62.511D open dispalced fx R thumb,R53.1 weakness, W19. Hebert Alert                                           Insurance/Certification information:  PT Insurance Information: [facility] [address]  Physician Information:   Dr Nadya Coronel of care signed:    [x] Yes  [] No    Date of Patient follow up with Physician:      Progress Report: []  Yes  [x]  No   If Yes, Date Range for reporting period:  Beginnin  Endin23 (5 visits)    Progress report due (10 Rx/or 30 days whichever is less):     Recertification due (POC duration/ or 90 days whichever is less):      Visit # Insurance Allowable Auth Needed    ,   By Medical Necessity []Yes   [x]No     RESTRICTIONS/PRECAUTIONS: high falls risk  Latex Allergy:  [x]NO      []YES  Preferred Language for Healthcare:   [x]English       []other:    Functional Scale: 23 UEFS  48/68 (67%)  Functional Scale: 23 UEFS: 73/80 (91%)  Functional Scale: 23 UEFS: 78/80 (98%)      Pain level:  0/10 L shoulder at rest    SUBJECTIVE: c/o stiffness but no pain, sleeping good    OBJECTIVE:   Observation: cane ambulation, toe out sequence, flexed hips and knees  Test measurements:  L shoulder ROM in supine (AA) flex increased from 120 to 130 degrees, abd  increased from 100 to 120 degrees, ER 85 (90 abd) with no change , IR 80 (90 abd) with no change  Active shoulder flexion in standing improved from 90 to 105 degrees, active abduction to 95 degrees  L shoulder strength:continues to be

## 2023-08-21 ENCOUNTER — APPOINTMENT (OUTPATIENT)
Dept: PHYSICAL THERAPY | Age: 88
End: 2023-08-21
Payer: MEDICARE

## 2023-08-23 ENCOUNTER — HOSPITAL ENCOUNTER (OUTPATIENT)
Dept: PHYSICAL THERAPY | Age: 88
Setting detail: THERAPIES SERIES
Discharge: HOME | End: 2023-08-23
Payer: MEDICARE

## 2023-08-23 PROCEDURE — 97110 THERAPEUTIC EXERCISES: CPT

## 2023-08-23 PROCEDURE — 97140 MANUAL THERAPY 1/> REGIONS: CPT

## 2023-08-23 NOTE — FLOWSHEET NOTE
[facility] Outpatient Therapy  [address], [address] [facility], [address] [place] [address] Avenue  Phone: [phone]   Fax: [phone]    Physical Therapy Treatment Note/ Progress Report:     Date:  2023     Patient Name:  Janelle Owens    :  1935  MRN: [id number]    Medical Diagnosis Information:  Diagnosis: S42.202D closed fx proximal L humerus, S62.511D open dispalced fx R thumb   Treatment Diagnosis: S42.202D closed fx proximal L humerus, S62.511D open dispalced fx R thumb,R53.1 weakness, W19. Bita Lack                                           Insurance/Certification information:  PT Insurance Information: [facility] [address]  Physician Information:   Dr Miguelangel Foster of care signed:    [x] Yes  [] No    Date of Patient follow up with Physician:      Progress Report: []  Yes  [x]  No   If Yes, Date Range for reporting period:  Beginnin  Endin23 (5 visits)    Progress report due (10 Rx/or 30 days whichever is less):     Recertification due (POC duration/ or 90 days whichever is less):      Visit # Insurance Allowable Auth Needed    ,   By Medical Necessity []Yes   [x]No     RESTRICTIONS/PRECAUTIONS: high falls risk  Latex Allergy:  [x]NO      []YES  Preferred Language for Healthcare:   [x]English       []other:    Functional Scale: 23 UEFS  48/68 (67%)  Functional Scale: 23 UEFS: 73/80 (91%)  Functional Scale: 23 UEFS: 78/80 (98%)      Pain level:  0/10 L shoulder at rest    SUBJECTIVE: Denies pain but c/o L UE stiffening up at night    OBJECTIVE:   Observation: cane ambulation, toe out sequence, flexed hips and knees  Test measurements:  L shoulder ROM in supine (AA) flex increased from 120 to 130 degrees, abd  increased from 100 to 120 degrees, ER 85 (90 abd) with no change , IR 80 (90 abd) with no change  Active shoulder flexion in standing improved from 90 to 105 degrees, active abduction to 95 degrees  L shoulder strength:continues

## 2023-08-25 ENCOUNTER — HOSPITAL ENCOUNTER (OUTPATIENT)
Dept: PHYSICAL THERAPY | Age: 88
Setting detail: THERAPIES SERIES
Discharge: HOME | End: 2023-08-25
Payer: MEDICARE

## 2023-08-25 PROCEDURE — 97110 THERAPEUTIC EXERCISES: CPT

## 2023-08-25 PROCEDURE — 97140 MANUAL THERAPY 1/> REGIONS: CPT

## 2023-08-25 NOTE — FLOWSHEET NOTE
3-    Exercises/Interventions: Exercises in bold performed in department today. Items not bolded are carried forward from prior visits for continuity of the record. Exercise/Equipment Resistance/Repetitions HEP Other comments     []    pulleys 3 min []    NUSTEP L5 5 min [x]      []    Single arm row 10 []    corner pec stretch 30 sec x 3 []    PROM L shoulder all directions/stretching L shoulder ER/IR, bicep stretch,  manual []     Supine cane flexion 1# 10x []    Serratus punch 20 [x]    Supine shoulder circles       Wall clocks X 3 []    Standing ER/IR Red tband x 20 [] Saeed for positioning   Wall ball circles 10 ea direction     Active Shoulder ER red tband x 20 [x]    rows red tband x 20 [x]    B shoulder extensions red tband x 10 [x]    Active shoulder flexion to 90 x10 []    Active shoulder abduction to 90 x10 []    Standing scaption thumb down 15     Standing scaption thumb up 15     Standing shoulder flexion slides on table  []      []      Access Code: [email or web address]  URL: [employer]/  Date: 07/18/2023  Prepared by: Niles Sumner    Exercises  - Seated Scapular Retraction  - 1 x daily - 7 x weekly - 2 sets - 10 reps  - Horizontal Shoulder Pendulum with Table Support  - 1 x daily - 7 x weekly - 1 sets - 10 reps  - Corner Pec Major Stretch  - 1 x daily - 7 x weekly - 1 sets - 3 reps - 30 hold  - Shoulder External Rotation and Scapular Retraction with Resistance  - 1 x daily - 7 x weekly - 1 sets - 20 reps  - Shoulder Flexion Wall Slide with Towel  - 1 x daily - 7 x weekly - 3 sets - 10 reps  - Wall Clock  - 1 x daily - 7 x weekly - 1 sets - 3 reps  - Supine Shoulder Flexion AAROM with Dowel  - 1 x daily - 7 x weekly - 2 sets - 10 reps  - Standing Row with Anchored Resistance  - 1 x daily - 7 x weekly - 3 sets - 10 reps  - Shoulder extension with resistance - Neutral  - 1 x daily - 7 x weekly - 1 sets - 10 reps    Therapeutic Exercise: per list x 32 min  [x] ([id number]) Provided verbal/tactile

## 2023-08-28 ENCOUNTER — HOSPITAL ENCOUNTER (OUTPATIENT)
Dept: PHYSICAL THERAPY | Age: 88
Setting detail: THERAPIES SERIES
Discharge: HOME | End: 2023-08-28
Payer: MEDICARE

## 2023-08-28 PROCEDURE — 97140 MANUAL THERAPY 1/> REGIONS: CPT

## 2023-08-28 PROCEDURE — 97110 THERAPEUTIC EXERCISES: CPT

## 2023-08-31 ENCOUNTER — HOSPITAL ENCOUNTER (OUTPATIENT)
Dept: PHYSICAL THERAPY | Age: 88
Setting detail: THERAPIES SERIES
Discharge: HOME | End: 2023-08-31
Payer: MEDICARE

## 2023-08-31 NOTE — CARE COORDINATION
[facility] Outpatient Therapy  [address] [place] [address], [address] [facility], [address] [place] [address] Avenue  Phone: [phone]   Fax: [phone]    Physical Therapy Missed Visit Note     Date:  2023    Patient Name:  Malvin Gallardo      :  1935    MRN: [id number]      Cancelled visits to date: 0  No-shows to date: 0    For today's appointment patient:  [x]  Cancelled  []  Rescheduled appointment  []  No-show     Reason given by patient:  []  Patient ill  []  Conflicting appointment  []  No transportation    []  Conflict with work  []  No reason given  []  Other:     Comments: taking  to MD appt     Electronically signed by:  Annmarie Padilla PT

## 2023-09-06 ENCOUNTER — HOSPITAL ENCOUNTER (OUTPATIENT)
Dept: PHYSICAL THERAPY | Age: 88
Setting detail: THERAPIES SERIES
Discharge: HOME | End: 2023-09-06
Payer: MEDICARE

## 2023-09-06 PROCEDURE — 97110 THERAPEUTIC EXERCISES: CPT

## 2023-09-06 PROCEDURE — 97140 MANUAL THERAPY 1/> REGIONS: CPT

## 2023-09-06 NOTE — FLOWSHEET NOTE
[facility] Outpatient Therapy  [address], [address] [facility], [address] [place] [address] Avenue  Phone: [phone]   Fax: [phone]    Physical Therapy Treatment Note/ Progress Report:     Date:  2023     Patient Name:  Weldon Kayser    :  1935  MRN: [id number]    Medical Diagnosis Information:  Diagnosis: S42.202D closed fx proximal L humerus, S62.511D open dispalced fx R thumb   Treatment Diagnosis: S42.202D closed fx proximal L humerus, S62.511D open dispalced fx R thumb,R53.1 weakness, W19. Eliot Rubinstein                                           Insurance/Certification information:  PT Insurance Information: [employer]  Physician Information:   Dr Savi Xiong of care signed:    [x] Yes  [] No    Date of Patient follow up with Physician:      Progress Report: []  Yes  [x]  No   If Yes, Date Range for reporting period:  Beginnin  Endin23 (5 visits)    Progress report due (10 Rx/or 30 days whichever is less):     Recertification due (POC duration/ or 90 days whichever is less):      Visit # Insurance Allowable Auth Needed    , 10/12  By Medical Necessity []Yes   [x]No     RESTRICTIONS/PRECAUTIONS: high falls risk  Latex Allergy:  [x]NO      []YES  Preferred Language for Healthcare:   [x]English       []other:    Functional Scale: 23 UEFS  48/68 (67%)  Functional Scale: 23 UEFS: 73/80 (91%)  Functional Scale: 23 UEFS: 78/80 (98%)      Pain level:  0/10 L shoulder at rest    SUBJECTIVE:  denies pain in L shoulder, can have 'twinge' in L shoulder if reaching quickly and not thinking.   R shoulder continues to wax and wane with pain mostly at night and reports \"bad\" night last night    OBJECTIVE:   Observation: cane ambulation, toe out sequence, flexed hips and knees  Test measurements:  L shoulder ROM in supine (AA) flex increased from 120 to 130 degrees, abd  increased from 100 to 120 degrees, ER 85 (90 abd) with no change , IR 80 (90

## 2023-09-08 ENCOUNTER — HOSPITAL ENCOUNTER (OUTPATIENT)
Dept: PHYSICAL THERAPY | Age: 88
Setting detail: THERAPIES SERIES
Discharge: HOME | End: 2023-09-08
Payer: MEDICARE

## 2023-09-08 NOTE — CARE COORDINATION
[facility] Outpatient Therapy  [address], [address] [facility], [address] [place] [address] Avenue  Phone: [phone]   Fax: [phone]    Physical Therapy Missed Visit Note     Date:  2023    Patient Name:  Jeffrey Coppola      :  1935    MRN: [id number]      Cancelled visits to date: 0  No-shows to date: 0    For today's appointment patient:  [x]  Cancelled  []  Rescheduled appointment  []  No-show     Reason given by patient:  []  Patient ill  []  Conflicting appointment  []  No transportation    []  Conflict with work  [x]  No reason given  []  Other:     Comments:      Electronically signed by:  Bryant Farley PT

## 2024-02-06 ENCOUNTER — OFFICE VISIT (OUTPATIENT)
Dept: ORTHOPEDIC SURGERY | Age: 89
End: 2024-02-06

## 2024-02-06 VITALS — WEIGHT: 160 LBS | HEIGHT: 66 IN | BODY MASS INDEX: 25.71 KG/M2

## 2024-02-06 DIAGNOSIS — M19.011 OSTEOARTHRITIS OF RIGHT SHOULDER, UNSPECIFIED OSTEOARTHRITIS TYPE: Primary | ICD-10-CM

## 2024-02-06 DIAGNOSIS — M25.511 RIGHT SHOULDER PAIN, UNSPECIFIED CHRONICITY: ICD-10-CM

## 2024-02-06 RX ORDER — TRIAMCINOLONE ACETONIDE 40 MG/ML
40 INJECTION, SUSPENSION INTRA-ARTICULAR; INTRAMUSCULAR ONCE
Status: COMPLETED | OUTPATIENT
Start: 2024-02-06 | End: 2024-02-06

## 2024-02-06 RX ORDER — LIDOCAINE HYDROCHLORIDE 10 MG/ML
4 INJECTION, SOLUTION INFILTRATION; PERINEURAL ONCE
Status: COMPLETED | OUTPATIENT
Start: 2024-02-06 | End: 2024-02-06

## 2024-02-06 RX ADMIN — LIDOCAINE HYDROCHLORIDE 4 ML: 10 INJECTION, SOLUTION INFILTRATION; PERINEURAL at 10:14

## 2024-02-06 RX ADMIN — TRIAMCINOLONE ACETONIDE 40 MG: 40 INJECTION, SUSPENSION INTRA-ARTICULAR; INTRAMUSCULAR at 10:15

## 2025-07-15 ENCOUNTER — OFFICE VISIT (OUTPATIENT)
Dept: URGENT CARE | Age: 89
End: 2025-07-15

## 2025-07-15 VITALS — OXYGEN SATURATION: 92 % | HEART RATE: 67 BPM | SYSTOLIC BLOOD PRESSURE: 123 MMHG | DIASTOLIC BLOOD PRESSURE: 69 MMHG

## 2025-07-15 DIAGNOSIS — R41.0 CONFUSION: ICD-10-CM
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
## 2025-07-15 NOTE — PROGRESS NOTES
TRIAGE NOTE TO THE EMERGENCY DEPARTMENT    CHIEF COMPLAINT    Chief Complaint   Patient presents with    Fall     Confusion, skin tears         MEDICAL DECISION MAKING    Patient presented with   Chief Complaint   Patient presents with    Fall     Confusion, skin tears    .  Discussed potential concerns for: head injury(brain bleed), chest injury.   Recommend patient to seek care at the nearest emergency department for further evaluation. We do not have Xray ability today and want patient to be evaluated for possible head injury/confusion     Transportation:  ambulance (patient states she has no one to call-Is okay with us calling ambulance for transport of patient)    TEST / PROCEDURES COMPLETED AT URGENT CARE:  1.No results found for this visit on 07/15/25.    Assessment & Plan     Skin bandaged up. Recommended ER evaluation.     Visit Diagnoses and Associated Orders         Fall, initial encounter    -  Primary           Confusion                      Subjective   HPI    Danya Pan is a 89 y.o. female who presents with multiple skin tears, abrasions, swelling of left wrist, pain to both knees, and SOB with breathing post fall earlier today.   She tried to grab a table when falling and hit both knees but the sharp corner of table went into her chest. Hse has had some SOB with breathing today.   She is not sure if she hit her head or not. She does admit to being \"out of it\" \"this is not my normal.\"  She does have major bruising to left side of face but states it is \"old\" bruising.   Patient is not on blood thinner.       CURRENT MEDICATIONS    Current Outpatient Rx   Medication Sig Dispense Refill    aspirin 81 MG EC tablet Take 1 tablet by mouth Twice a Week 60 tablet 0    atorvastatin (LIPITOR) 40 MG tablet Take 1 tablet by mouth at bedtime      carvedilol (COREG) 3.125 MG tablet Take 1 tablet by mouth 2 times daily (with meals)      cloNIDine (CATAPRES) 0.1 MG tablet Take 1 tablet by mouth daily as needed

## 2025-07-17 ENCOUNTER — OFFICE VISIT (OUTPATIENT)
Dept: URGENT CARE | Age: 89
End: 2025-07-17

## 2025-07-17 VITALS
BODY MASS INDEX: 23.3 KG/M2 | RESPIRATION RATE: 19 BRPM | WEIGHT: 145 LBS | HEIGHT: 66 IN | SYSTOLIC BLOOD PRESSURE: 97 MMHG | DIASTOLIC BLOOD PRESSURE: 63 MMHG | HEART RATE: 66 BPM | TEMPERATURE: 97.3 F

## 2025-07-17 DIAGNOSIS — S81.811A NONINFECTED SKIN TEAR OF RIGHT LOWER EXTREMITY, INITIAL ENCOUNTER: ICD-10-CM

## 2025-07-17 DIAGNOSIS — S61.412A SKIN TEAR OF LEFT HAND WITHOUT COMPLICATION, INITIAL ENCOUNTER: Primary | ICD-10-CM

## 2025-07-17 ASSESSMENT — ENCOUNTER SYMPTOMS
GASTROINTESTINAL NEGATIVE: 1
RESPIRATORY NEGATIVE: 1

## 2025-07-17 NOTE — PATIENT INSTRUCTIONS
Keep the area clean and dry to the best of your ability     Wash it daily with warm soapy water, more often if it becomes dirty     Tylenol or ibuprofen as needed for pain following the 's recommendations on dosing     If you start to have increased pain, redness, swelling, drainage, fever, chills, or nausea return here or go to the Emergency Department

## 2025-07-17 NOTE — PROGRESS NOTES
Danya Pan (: 9/10/1935) is a 89 y.o. female, Established patient, here for evaluation of the following chief complaint(s):  Follow-up (Patient fell a few days ago, went to ER and had multiple x-rays. Has wounds with bandages that she would like checked to make sure they look appropriate and possibly changed. Unsure about drainage or redness. Some pain with breathing (complained of this the other night). Denies pain over wounds.)      ASSESSMENT/PLAN:    ICD-10-CM    1. Skin tear of left hand without complication, initial encounter  S61.412A       2. Noninfected skin tear of right lower extremity, initial encounter  S81.811A               Patient exam as below. Patient was ambulatory, nontoxic, and medically stable.    History obtained from patient.     The patient's external records have been reviewed.      Differential diagnosis considered. Overall presentation is consistent with skin tear of the left posterior hand and right anterior knee.  These were rebandaged during this encounter and the patient was educated on wound care to include keeping the areas clean and dry to the best of her ability, frequent replacement (once daily) of bandaging or more as needed.     Danya Pan will be treated outpatient with wound care instructions, recommendation to follow-up with PCP for further wound evaluation as needed.     Disposition: Discharged with instructions to obtain outpatient follow up via their primary care provider in the next 3 to 5 days, with strict return precautions if patient develops new or worsening symptoms.        SUBJECTIVE/OBJECTIVE:  In brief, Danya is a generally well-appearing 89-year-old female who presents today for wound evaluation.  She states that she fell several days ago and was seen in the emergency department had several of her wounds bandaged.  States that she has skin tears of the left posterior hand and right anterior knee.  She states that she would like to have these wounds

## (undated) DEVICE — PACK PROCEDURE SURG EXTREMITY MFFOP CUST

## (undated) DEVICE — GLOVE SURG SZ 65 THK91MIL LTX FREE SYN POLYISOPRENE

## (undated) DEVICE — BANDAGE GZ W2XL75IN ST RAYON POLY CNFRM STRTCH LTWT

## (undated) DEVICE — MEDICINE CUP, GRADUATED, STER: Brand: MEDLINE

## (undated) DEVICE — Z INACTIVE USE 2855096 SPONGE GZ W4XL4IN 8 PLY 100% COT

## (undated) DEVICE — DRESSING,GAUZE,XEROFORM,CURAD,1"X8",ST: Brand: CURAD

## (undated) DEVICE — DRAPE,U/ SHT,SPLIT,PLAS,STERIL: Brand: MEDLINE

## (undated) DEVICE — INTENDED FOR TISSUE SEPARATION, AND OTHER PROCEDURES THAT REQUIRE A SHARP SURGICAL BLADE TO PUNCTURE OR CUT.: Brand: BARD-PARKER ® STAINLESS STEEL BLADES

## (undated) DEVICE — GLOVE ORANGE PI 8   MSG9080

## (undated) DEVICE — SOLUTION IRRIG 500ML 0.9% SOD CHLO USP POUR PLAS BTL

## (undated) DEVICE — BANDAGE COMPR W2INXL5YD TAN BRTH SELF ADH WRP W/ HND TEAR

## (undated) DEVICE — GLOVE ORTHO 8   MSG9480

## (undated) DEVICE — SYRINGE, LUER LOCK, 10ML: Brand: MEDLINE

## (undated) DEVICE — GLOVE SURG SZ 65 L12IN FNGR THK79MIL GRN LTX FREE

## (undated) DEVICE — TOWEL,OR,DSP,ST,BLUE,STD,4/PK,20PK/CS: Brand: MEDLINE

## (undated) DEVICE — Z DISCONTINUED USE 2272117 DRAPE SURG 3 QTR N INVASIVE 2 LAYR DISP

## (undated) DEVICE — BANDAGE ELASTIC 5YDX4IN ST COMPRSS LF NON ADH